# Patient Record
Sex: MALE | Race: WHITE | Employment: PART TIME | URBAN - METROPOLITAN AREA
[De-identification: names, ages, dates, MRNs, and addresses within clinical notes are randomized per-mention and may not be internally consistent; named-entity substitution may affect disease eponyms.]

---

## 2018-12-14 ENCOUNTER — APPOINTMENT (OUTPATIENT)
Dept: GENERAL RADIOLOGY | Age: 28
End: 2018-12-14

## 2018-12-14 ENCOUNTER — HOSPITAL ENCOUNTER (EMERGENCY)
Age: 28
Discharge: HOME | End: 2018-12-14
Attending: EMERGENCY MEDICINE

## 2018-12-14 VITALS
TEMPERATURE: 98.5 F | HEIGHT: 72 IN | BODY MASS INDEX: 32.51 KG/M2 | OXYGEN SATURATION: 98 % | SYSTOLIC BLOOD PRESSURE: 135 MMHG | HEART RATE: 82 BPM | WEIGHT: 240 LBS | RESPIRATION RATE: 16 BRPM | DIASTOLIC BLOOD PRESSURE: 85 MMHG

## 2018-12-14 DIAGNOSIS — L03.031 CELLULITIS OF TOE OF RIGHT FOOT: Primary | ICD-10-CM

## 2018-12-14 PROCEDURE — 99283 EMERGENCY DEPT VISIT LOW MDM: CPT

## 2018-12-14 PROCEDURE — 73630 X-RAY EXAM OF FOOT: CPT

## 2018-12-14 PROCEDURE — 6370000000 HC RX 637 (ALT 250 FOR IP): Performed by: EMERGENCY MEDICINE

## 2018-12-14 RX ORDER — CEPHALEXIN 500 MG/1
500 CAPSULE ORAL ONCE
Status: COMPLETED | OUTPATIENT
Start: 2018-12-14 | End: 2018-12-14

## 2018-12-14 RX ORDER — CEPHALEXIN 500 MG/1
500 CAPSULE ORAL 4 TIMES DAILY
Qty: 40 CAPSULE | Refills: 0 | Status: SHIPPED | OUTPATIENT
Start: 2018-12-14 | End: 2018-12-24

## 2018-12-14 RX ADMIN — CEPHALEXIN 500 MG: 500 CAPSULE ORAL at 22:01

## 2018-12-14 ASSESSMENT — PAIN DESCRIPTION - FREQUENCY: FREQUENCY: CONTINUOUS

## 2018-12-14 ASSESSMENT — PAIN SCALES - GENERAL: PAINLEVEL_OUTOF10: 4

## 2018-12-14 ASSESSMENT — PAIN DESCRIPTION - ONSET: ONSET: GRADUAL

## 2018-12-14 ASSESSMENT — PAIN DESCRIPTION - PAIN TYPE: TYPE: ACUTE PAIN

## 2018-12-14 ASSESSMENT — PAIN DESCRIPTION - PROGRESSION: CLINICAL_PROGRESSION: GRADUALLY WORSENING

## 2018-12-14 ASSESSMENT — PAIN DESCRIPTION - LOCATION: LOCATION: FOOT

## 2018-12-14 ASSESSMENT — PAIN DESCRIPTION - DESCRIPTORS: DESCRIPTORS: THROBBING;SHARP

## 2018-12-15 NOTE — ED PROVIDER NOTES
Dispense Refill    albuterol (PROVENTIL HFA) 108 (90 BASE) MCG/ACT inhaler Inhale 2 puffs into the lungs every 4 hours as needed for Wheezing. 1 Inhaler 3    PROVENTIL  (90 BASE) MCG/ACT inhaler INHALE 2 PUFFS INTO LUNGS EVERY 6 HOURS AS NEEDED FOR WHEEZING 1 Inhaler 3    naproxen (NAPROSYN) 500 MG tablet Take 1 tablet by mouth 2 times daily for 7 days 14 tablet 0    ALBUTEROL SULFATE HFA IN Inhale 2 puffs into the lungs daily as needed. No Known Allergies    REVIEW OF SYSTEMS  10 systems reviewed, pertinent positives per HPI otherwise noted to be negative     PHYSICAL EXAM  /85   Pulse 82   Temp 98.5 °F (36.9 °C) (Oral)   Resp 16   Ht 6' (1.829 m)   Wt 240 lb (108.9 kg)   SpO2 98%   BMI 32.55 kg/m²   GENERAL APPEARANCE: Awake and alert. Cooperative. In no obvious distress. HEAD: Normocephalic. Atraumatic. EYES: PERRL. EOM's grossly intact. ENT: Mucous membranes are pink and moist.   NECK: Supple. HEART: RRR. No murmurs. LUNGS: Respirations unlabored. CTAB. Good air exchange. ABDOMEN: Soft. Non-distended. Non-tender. No masses. No organomegaly. No guarding or rebound. EXTREMITIES: No peripheral edema. Moves all extremities equally. All extremities neurovascularly intact. There is redness to the medial aspect of the right foot and ankle which creeps up just above the malleolus. It is tender to touch is mildly swollen as well. There is no obvious indications of a penetrating foreign body from examination of the foot. SKIN: Warm and dry. No acute rashes. NEUROLOGICAL: Alert and oriented. Strength 5/5, sensation intact. Gait normal.   PSYCHIATRIC: Normal mood and affect. No HI or SI expressed to me. ED COURSE/MDM  Is no radiopaque foreign body so I think this patient probably got as a initiation of a cellulitis through the cracks in the sole of his foot. He'll be treated with p.o. antibiotics.   The redness will be outlined and he'll be advised on how to watch that

## 2018-12-15 NOTE — ED NOTES
PT DISCHARGED WITH VERBAL AND WRITTEN INSTRUCTIONS. PT REQUESTING WORK NOTE AND PAIN MEDICATION. ADVISED PT TO TAKE IBUPROFEN AND TYLENOL AS DIRECTED ON BOTTLE. WORK NOTE WRITTEN OFF UNTIL Tuesday.      Neyda Barnett RN  12/14/18 4342

## 2019-11-11 ENCOUNTER — APPOINTMENT (OUTPATIENT)
Dept: GENERAL RADIOLOGY | Age: 29
End: 2019-11-11
Payer: MEDICAID

## 2019-11-11 ENCOUNTER — HOSPITAL ENCOUNTER (EMERGENCY)
Age: 29
Discharge: HOME | End: 2019-11-11
Attending: EMERGENCY MEDICINE
Payer: MEDICAID

## 2019-11-11 VITALS
HEIGHT: 72 IN | SYSTOLIC BLOOD PRESSURE: 145 MMHG | WEIGHT: 240 LBS | RESPIRATION RATE: 15 BRPM | TEMPERATURE: 98.2 F | DIASTOLIC BLOOD PRESSURE: 78 MMHG | HEART RATE: 72 BPM | BODY MASS INDEX: 32.51 KG/M2 | OXYGEN SATURATION: 99 %

## 2019-11-11 DIAGNOSIS — I10 ESSENTIAL HYPERTENSION: ICD-10-CM

## 2019-11-11 DIAGNOSIS — S92.511A CLOSED DISPLACED FRACTURE OF PROXIMAL PHALANX OF LESSER TOE OF RIGHT FOOT, INITIAL ENCOUNTER: Primary | ICD-10-CM

## 2019-11-11 PROCEDURE — 28515 TREATMENT OF TOE FRACTURE: CPT

## 2019-11-11 PROCEDURE — 99283 EMERGENCY DEPT VISIT LOW MDM: CPT

## 2019-11-11 PROCEDURE — 73660 X-RAY EXAM OF TOE(S): CPT

## 2019-11-11 ASSESSMENT — PAIN DESCRIPTION - LOCATION: LOCATION: TOE (COMMENT WHICH ONE)

## 2019-11-11 ASSESSMENT — PAIN SCALES - GENERAL: PAINLEVEL_OUTOF10: 1

## 2019-11-11 ASSESSMENT — PAIN DESCRIPTION - PAIN TYPE: TYPE: ACUTE PAIN

## 2019-11-11 ASSESSMENT — PAIN DESCRIPTION - ORIENTATION: ORIENTATION: RIGHT

## 2019-11-11 ASSESSMENT — PAIN DESCRIPTION - DESCRIPTORS: DESCRIPTORS: NUMBNESS

## 2020-06-11 ENCOUNTER — HOSPITAL ENCOUNTER (OUTPATIENT)
Age: 30
Discharge: HOME | End: 2020-06-11
Payer: COMMERCIAL

## 2020-06-11 LAB
INR BLD: 0.99 (ref 0.86–1.14)
PROTHROMBIN TIME: 11.5 SEC (ref 10–13.2)

## 2020-06-11 PROCEDURE — 80053 COMPREHEN METABOLIC PANEL: CPT

## 2020-06-11 PROCEDURE — 85610 PROTHROMBIN TIME: CPT

## 2020-06-11 PROCEDURE — 80307 DRUG TEST PRSMV CHEM ANLYZR: CPT

## 2020-06-11 PROCEDURE — 85025 COMPLETE CBC W/AUTO DIFF WBC: CPT

## 2020-06-11 PROCEDURE — 87522 HEPATITIS C REVRS TRNSCRPJ: CPT

## 2020-06-11 PROCEDURE — 36415 COLL VENOUS BLD VENIPUNCTURE: CPT

## 2020-06-11 PROCEDURE — 82105 ALPHA-FETOPROTEIN SERUM: CPT

## 2020-06-11 PROCEDURE — 87902 NFCT AGT GNTYP ALYS HEP C: CPT

## 2020-06-12 LAB
A/G RATIO: 1.8 (ref 1.1–2.2)
ALBUMIN SERPL-MCNC: 4.3 G/DL (ref 3.4–5)
ALP BLD-CCNC: 61 U/L (ref 40–129)
ALT SERPL-CCNC: 118 U/L (ref 10–40)
AMPHETAMINE SCREEN, URINE: NORMAL
ANION GAP SERPL CALCULATED.3IONS-SCNC: 10 MMOL/L (ref 3–16)
AST SERPL-CCNC: 76 U/L (ref 15–37)
BARBITURATE SCREEN URINE: NORMAL
BASOPHILS ABSOLUTE: 0 K/UL (ref 0–0.2)
BASOPHILS RELATIVE PERCENT: 0.4 %
BENZODIAZEPINE SCREEN, URINE: NORMAL
BILIRUB SERPL-MCNC: 0.8 MG/DL (ref 0–1)
BUN BLDV-MCNC: 9 MG/DL (ref 7–20)
CALCIUM SERPL-MCNC: 8.3 MG/DL (ref 8.3–10.6)
CANNABINOID SCREEN URINE: NORMAL
CHLORIDE BLD-SCNC: 106 MMOL/L (ref 99–110)
CO2: 25 MMOL/L (ref 21–32)
COCAINE METABOLITE SCREEN URINE: NORMAL
CREAT SERPL-MCNC: 0.8 MG/DL (ref 0.9–1.3)
EOSINOPHILS ABSOLUTE: 0.2 K/UL (ref 0–0.6)
EOSINOPHILS RELATIVE PERCENT: 2.6 %
GFR AFRICAN AMERICAN: >60
GFR NON-AFRICAN AMERICAN: >60
GLOBULIN: 2.4 G/DL
GLUCOSE BLD-MCNC: 97 MG/DL (ref 70–99)
HCT VFR BLD CALC: 40 % (ref 40.5–52.5)
HEMOGLOBIN: 14 G/DL (ref 13.5–17.5)
LYMPHOCYTES ABSOLUTE: 1.7 K/UL (ref 1–5.1)
LYMPHOCYTES RELATIVE PERCENT: 25.3 %
Lab: NORMAL
MCH RBC QN AUTO: 31.5 PG (ref 26–34)
MCHC RBC AUTO-ENTMCNC: 35.1 G/DL (ref 31–36)
MCV RBC AUTO: 89.8 FL (ref 80–100)
METHADONE SCREEN, URINE: NORMAL
MONOCYTES ABSOLUTE: 0.7 K/UL (ref 0–1.3)
MONOCYTES RELATIVE PERCENT: 10.1 %
NEUTROPHILS ABSOLUTE: 4.1 K/UL (ref 1.7–7.7)
NEUTROPHILS RELATIVE PERCENT: 61.6 %
OPIATE SCREEN URINE: NORMAL
OXYCODONE URINE: NORMAL
PDW BLD-RTO: 12.3 % (ref 12.4–15.4)
PH UA: 5
PHENCYCLIDINE SCREEN URINE: NORMAL
PLATELET # BLD: 233 K/UL (ref 135–450)
PMV BLD AUTO: 8.2 FL (ref 5–10.5)
POTASSIUM SERPL-SCNC: 3.8 MMOL/L (ref 3.5–5.1)
PROPOXYPHENE SCREEN: NORMAL
RBC # BLD: 4.45 M/UL (ref 4.2–5.9)
SODIUM BLD-SCNC: 141 MMOL/L (ref 136–145)
TOTAL PROTEIN: 6.7 G/DL (ref 6.4–8.2)
WBC # BLD: 6.7 K/UL (ref 4–11)

## 2020-06-15 LAB
HCV QNT BY NAAT IU/ML: ABNORMAL IU/ML
HCV QNT BY NAAT LOG IU/ML: 5.93 LOG IU/ML
INTERPRETATION: DETECTED

## 2020-06-16 LAB — AFP: 2.3 UG/L

## 2020-06-18 LAB — HEPATITIS C GENOTYPE: NORMAL

## 2021-01-03 ENCOUNTER — HOSPITAL ENCOUNTER (EMERGENCY)
Age: 31
Discharge: HOME | End: 2021-01-03
Attending: STUDENT IN AN ORGANIZED HEALTH CARE EDUCATION/TRAINING PROGRAM
Payer: COMMERCIAL

## 2021-01-03 ENCOUNTER — APPOINTMENT (OUTPATIENT)
Dept: CT IMAGING | Age: 31
End: 2021-01-03
Payer: COMMERCIAL

## 2021-01-03 VITALS
BODY MASS INDEX: 33.18 KG/M2 | SYSTOLIC BLOOD PRESSURE: 142 MMHG | HEIGHT: 72 IN | TEMPERATURE: 98.2 F | DIASTOLIC BLOOD PRESSURE: 81 MMHG | RESPIRATION RATE: 18 BRPM | WEIGHT: 245 LBS | OXYGEN SATURATION: 96 % | HEART RATE: 80 BPM

## 2021-01-03 DIAGNOSIS — J18.9 PNEUMONIA DUE TO ORGANISM: ICD-10-CM

## 2021-01-03 DIAGNOSIS — R10.9 BILATERAL FLANK PAIN: Primary | ICD-10-CM

## 2021-01-03 LAB
A/G RATIO: 1.5 (ref 1.1–2.2)
ALBUMIN SERPL-MCNC: 4.1 G/DL (ref 3.4–5)
ALP BLD-CCNC: 64 U/L (ref 40–129)
ALT SERPL-CCNC: 73 U/L (ref 10–40)
AMORPHOUS: ABNORMAL /HPF
ANION GAP SERPL CALCULATED.3IONS-SCNC: 5 MMOL/L (ref 3–16)
AST SERPL-CCNC: 35 U/L (ref 15–37)
BACTERIA: ABNORMAL /HPF
BASOPHILS ABSOLUTE: 0 K/UL (ref 0–0.2)
BASOPHILS RELATIVE PERCENT: 0.5 %
BILIRUB SERPL-MCNC: 0.4 MG/DL (ref 0–1)
BILIRUBIN URINE: NEGATIVE
BLOOD, URINE: ABNORMAL
BUN BLDV-MCNC: 11 MG/DL (ref 7–20)
CALCIUM SERPL-MCNC: 9.3 MG/DL (ref 8.3–10.6)
CHLORIDE BLD-SCNC: 105 MMOL/L (ref 99–110)
CLARITY: ABNORMAL
CO2: 31 MMOL/L (ref 21–32)
COLOR: YELLOW
CREAT SERPL-MCNC: 0.9 MG/DL (ref 0.9–1.3)
EOSINOPHILS ABSOLUTE: 0.2 K/UL (ref 0–0.6)
EOSINOPHILS RELATIVE PERCENT: 3.1 %
EPITHELIAL CELLS, UA: ABNORMAL /HPF (ref 0–5)
GFR AFRICAN AMERICAN: >60
GFR NON-AFRICAN AMERICAN: >60
GLOBULIN: 2.8 G/DL
GLUCOSE BLD-MCNC: 100 MG/DL (ref 70–99)
GLUCOSE URINE: NEGATIVE MG/DL
HCT VFR BLD CALC: 42.4 % (ref 40.5–52.5)
HEMOGLOBIN: 14.4 G/DL (ref 13.5–17.5)
KETONES, URINE: ABNORMAL MG/DL
LEUKOCYTE ESTERASE, URINE: NEGATIVE
LIPASE: 28 U/L (ref 13–60)
LYMPHOCYTES ABSOLUTE: 1.9 K/UL (ref 1–5.1)
LYMPHOCYTES RELATIVE PERCENT: 24.3 %
MCH RBC QN AUTO: 30.7 PG (ref 26–34)
MCHC RBC AUTO-ENTMCNC: 34 G/DL (ref 31–36)
MCV RBC AUTO: 90.1 FL (ref 80–100)
MICROSCOPIC EXAMINATION: YES
MONOCYTES ABSOLUTE: 0.8 K/UL (ref 0–1.3)
MONOCYTES RELATIVE PERCENT: 10.7 %
NEUTROPHILS ABSOLUTE: 4.7 K/UL (ref 1.7–7.7)
NEUTROPHILS RELATIVE PERCENT: 61.4 %
NITRITE, URINE: NEGATIVE
PDW BLD-RTO: 12.6 % (ref 12.4–15.4)
PH UA: 7.5 (ref 5–8)
PLATELET # BLD: 210 K/UL (ref 135–450)
PMV BLD AUTO: 7.2 FL (ref 5–10.5)
POTASSIUM REFLEX MAGNESIUM: 3.9 MMOL/L (ref 3.5–5.1)
PROTEIN UA: NEGATIVE MG/DL
RBC # BLD: 4.71 M/UL (ref 4.2–5.9)
RBC UA: ABNORMAL /HPF (ref 0–4)
SODIUM BLD-SCNC: 141 MMOL/L (ref 136–145)
SPECIFIC GRAVITY UA: 1.02 (ref 1–1.03)
TOTAL PROTEIN: 6.9 G/DL (ref 6.4–8.2)
URINE TYPE: ABNORMAL
UROBILINOGEN, URINE: 0.2 E.U./DL
WBC # BLD: 7.7 K/UL (ref 4–11)
WBC UA: ABNORMAL /HPF (ref 0–5)

## 2021-01-03 PROCEDURE — 6360000002 HC RX W HCPCS: Performed by: STUDENT IN AN ORGANIZED HEALTH CARE EDUCATION/TRAINING PROGRAM

## 2021-01-03 PROCEDURE — 74176 CT ABD & PELVIS W/O CONTRAST: CPT

## 2021-01-03 PROCEDURE — 80053 COMPREHEN METABOLIC PANEL: CPT

## 2021-01-03 PROCEDURE — 96374 THER/PROPH/DIAG INJ IV PUSH: CPT

## 2021-01-03 PROCEDURE — 85025 COMPLETE CBC W/AUTO DIFF WBC: CPT

## 2021-01-03 PROCEDURE — 83690 ASSAY OF LIPASE: CPT

## 2021-01-03 PROCEDURE — 96375 TX/PRO/DX INJ NEW DRUG ADDON: CPT

## 2021-01-03 PROCEDURE — 81001 URINALYSIS AUTO W/SCOPE: CPT

## 2021-01-03 PROCEDURE — 99283 EMERGENCY DEPT VISIT LOW MDM: CPT

## 2021-01-03 PROCEDURE — 36415 COLL VENOUS BLD VENIPUNCTURE: CPT

## 2021-01-03 RX ORDER — FLUTICASONE PROPIONATE 50 MCG
1 SPRAY, SUSPENSION (ML) NASAL DAILY
COMMUNITY

## 2021-01-03 RX ORDER — AZITHROMYCIN 250 MG/1
250 TABLET, FILM COATED ORAL SEE ADMIN INSTRUCTIONS
Qty: 6 TABLET | Refills: 0 | Status: SHIPPED | OUTPATIENT
Start: 2021-01-03 | End: 2021-01-08

## 2021-01-03 RX ORDER — ONDANSETRON 2 MG/ML
4 INJECTION INTRAMUSCULAR; INTRAVENOUS ONCE
Status: COMPLETED | OUTPATIENT
Start: 2021-01-03 | End: 2021-01-03

## 2021-01-03 RX ORDER — KETOROLAC TROMETHAMINE 30 MG/ML
15 INJECTION, SOLUTION INTRAMUSCULAR; INTRAVENOUS ONCE
Status: COMPLETED | OUTPATIENT
Start: 2021-01-03 | End: 2021-01-03

## 2021-01-03 RX ADMIN — ONDANSETRON HYDROCHLORIDE 4 MG: 2 INJECTION, SOLUTION INTRAMUSCULAR; INTRAVENOUS at 20:05

## 2021-01-03 RX ADMIN — KETOROLAC TROMETHAMINE 15 MG: 30 INJECTION, SOLUTION INTRAMUSCULAR at 20:05

## 2021-01-03 ASSESSMENT — PAIN SCALES - GENERAL: PAINLEVEL_OUTOF10: 2

## 2021-01-04 NOTE — ED NOTES
Pain since Thursday. Pain increased in mid back and worse on right. Denies radiation into legs. Denies any numbness and tingling in lower ext. Reports noticed pain when leaning over bathtub given infant a bath.  Reports pain in worse with certain movements     Danna See, RN  01/03/21 5947 [facility], RN  01/03/21 3801

## 2021-01-04 NOTE — ED PROVIDER NOTES
[facility] EMERGENCY DEPARTMENT      CHIEF COMPLAINT  Flank Pain (per pt.s/s started 3 days ago thought it was more of a muscular issue, but now is more painful and urine is dark, pt. denies dificulty with urinating  )     HISTORY OF PRESENT ILLNESS  Re Evans is a 31 y.o. male  who presents to the ED complaining of bilateral flank pain, worse on the right. Patient states that he has had symptoms for the past 3 to 4 days. She noticed this last Wednesday, and it worsened on Thursday as he was bending over to give his baby a bath. He denies any other known injuries. He states that it was worse yesterday and he slightly improved today. However, he states that his urine has been dark and his fiancée is concerned that it may be a kidney issue given its location and his dark urine. He denies any dysuria or hematuria. He denies any history of kidney stones. He denies any numbness or tingling, loss of bowel or bladder continence, recent unintentional weight loss, history of IV drug use, history of cancer. He states he has been taking naproxen at home which has helped slightly with his symptoms. He states that at times, is been so severe that he is unable to sit still and is rolling around in pain. He also states that he has had some unexplained nausea over this time as well. Denies any fevers. Denies any chest pain or shortness of breath. He denies other complaints at this time. No other complaints, modifying factors or associated symptoms. I have reviewed the following from the nursing documentation. Past Medical History:   Diagnosis Date    Ankle fracture     Asthma      Past Surgical History:   Procedure Laterality Date    TYMPANOSTOMY TUBE PLACEMENT       History reviewed. No pertinent family history.   Social History     Socioeconomic History    Marital status: Single     Spouse name: Not on file    Number of children: Not on file    Years of education: Not on file  Highest education level: Not on file   Occupational History    Not on file   Social Needs    Financial resource strain: Not on file    Food insecurity     Worry: Not on file     Inability: Not on file    Transportation needs     Medical: Not on file     Non-medical: Not on file   Tobacco Use    Smoking status: Former Smoker     Packs/day: 1.00    Smokeless tobacco: Current User   Substance and Sexual Activity    Alcohol use: No    Drug use: No     Comment: Heroin    Sexual activity: Yes     Partners: Female   Lifestyle    Physical activity     Days per week: Not on file     Minutes per session: Not on file    Stress: Not on file   Relationships    Social connections     Talks on phone: Not on file     Gets together: Not on file     Attends Pentecostal service: Not on file     Active member of club or organization: Not on file     Attends meetings of clubs or organizations: Not on file     Relationship status: Not on file    Intimate partner violence     Fear of current or ex partner: Not on file     Emotionally abused: Not on file     Physically abused: Not on file     Forced sexual activity: Not on file   Other Topics Concern    Not on file   Social History Narrative    Not on file     No current facility-administered medications for this encounter. Current Outpatient Medications   Medication Sig Dispense Refill    fluticasone (FLONASE) 50 MCG/ACT nasal spray 1 spray by Each Nostril route daily      azithromycin (ZITHROMAX) 250 MG tablet Take 1 tablet by mouth See Admin Instructions for 5 days 500mg on day 1 followed by 250mg on days 2 - 5 6 tablet 0    Budesonide-Formoterol Fumarate (SYMBICORT IN) Inhale into the lungs       No Known Allergies    REVIEW OF SYSTEMS  10 systems reviewed, pertinent positives per HPI otherwise noted to be negative.     PHYSICAL EXAM BP (!) 142/81   Pulse 80   Temp 98.2 °F (36.8 °C) (Oral)   Resp 18   Ht 6' (1.829 m)   Wt 245 lb (111.1 kg)   SpO2 96%   BMI 33.23 kg/m²    GENERAL APPEARANCE: Awake and alert. Cooperative. No acute distress. HENT: Normocephalic. Atraumatic. NECK: Supple. EYES: PERRL. EOM's grossly intact. HEART/CHEST: RRR. No murmurs. LUNGS: Respirations unlabored. CTAB. Good air exchange. Speaking comfortably in full sentences. ABDOMEN: No tenderness. Soft. Non-distended. No masses. No organomegaly. No guarding or rebound. MUSCULOSKELETAL: No extremity edema. Compartments soft. No deformity. No tenderness in the extremities. All extremities neurovascularly intact. BACK: No true CVA tenderness. There is bilateral paraspinal tenderness to palpation in the lumbar spine. No midline tenderness no C, T, or L-spine. SKIN: Warm and dry. No acute rashes. NEUROLOGICAL: Alert and oriented. CN's 2-12 intact. No gross facial drooping. Strength 5/5, sensation intact. Positive straight leg raise on L, negative on R.  PSYCHIATRIC: Anxious    LABS  I have reviewed all labs for this visit.    Results for orders placed or performed during the hospital encounter of 01/03/21   Urinalysis   Result Value Ref Range    Color, UA Yellow Straw/Yellow    Clarity, UA SL CLOUDY (A) Clear    Glucose, Ur Negative Negative mg/dL    Bilirubin Urine Negative Negative    Ketones, Urine TRACE (A) Negative mg/dL    Specific Gravity, UA 1.020 1.005 - 1.030    Blood, Urine SMALL (A) Negative    pH, UA 7.5 5.0 - 8.0    Protein, UA Negative Negative mg/dL    Urobilinogen, Urine 0.2 <2.0 E.U./dL    Nitrite, Urine Negative Negative    Leukocyte Esterase, Urine Negative Negative    Microscopic Examination YES     Urine Type NotGiven    Microscopic Urinalysis   Result Value Ref Range    WBC, UA 0-2 0 - 5 /HPF    RBC, UA 3-4 0 - 4 /HPF    Epithelial Cells, UA 0-1 0 - 5 /HPF    Bacteria, UA Rare (A) None Seen /HPF    Amorphous, UA 1+ /HPF CBC Auto Differential   Result Value Ref Range    WBC 7.7 4.0 - 11.0 K/uL    RBC 4.71 4.20 - 5.90 M/uL    Hemoglobin 14.4 13.5 - 17.5 g/dL    Hematocrit 42.4 40.5 - 52.5 %    MCV 90.1 80.0 - 100.0 fL    MCH 30.7 26.0 - 34.0 pg    MCHC 34.0 31.0 - 36.0 g/dL    RDW 12.6 12.4 - 15.4 %    Platelets 889 824 - 304 K/uL    MPV 7.2 5.0 - 10.5 fL    Neutrophils % 61.4 %    Lymphocytes % 24.3 %    Monocytes % 10.7 %    Eosinophils % 3.1 %    Basophils % 0.5 %    Neutrophils Absolute 4.7 1.7 - 7.7 K/uL    Lymphocytes Absolute 1.9 1.0 - 5.1 K/uL    Monocytes Absolute 0.8 0.0 - 1.3 K/uL    Eosinophils Absolute 0.2 0.0 - 0.6 K/uL    Basophils Absolute 0.0 0.0 - 0.2 K/uL   Comprehensive Metabolic Panel w/ Reflex to MG   Result Value Ref Range    Sodium 141 136 - 145 mmol/L    Potassium reflex Magnesium 3.9 3.5 - 5.1 mmol/L    Chloride 105 99 - 110 mmol/L    CO2 31 21 - 32 mmol/L    Anion Gap 5 3 - 16    Glucose 100 (H) 70 - 99 mg/dL    BUN 11 7 - 20 mg/dL    CREATININE 0.9 0.9 - 1.3 mg/dL    GFR Non-African American >60 >60    GFR African American >60 >60    Calcium 9.3 8.3 - 10.6 mg/dL    Total Protein 6.9 6.4 - 8.2 g/dL    Alb 4.1 3.4 - 5.0 g/dL    Albumin/Globulin Ratio 1.5 1.1 - 2.2    Total Bilirubin 0.4 0.0 - 1.0 mg/dL    Alkaline Phosphatase 64 40 - 129 U/L    ALT 73 (H) 10 - 40 U/L    AST 35 15 - 37 U/L    Globulin 2.8 g/dL   Lipase   Result Value Ref Range    Lipase 28.0 13.0 - 60.0 U/L     RADIOLOGY  CT ABDOMEN PELVIS WO CONTRAST Additional Contrast? None   Final Result   No acute intra-abdominal or pelvic abnormality. Small focal left lower lobe infiltrate suspicious for pneumonia. ED COURSE/MDM  Patient seen and evaluated. Old records reviewed. Labs and imaging reviewed and results discussed with patient. Patient presents with concerns for bilateral low back/flank pain for the past several days, as well as dark urine. Full HPI as detailed above. Upon arrival in the ED, vitals reassuring. Patient resting comfortably. Physical exam as detailed above, no abdominal tenderness. No midline tenderness palpation the C, T, or L-spine. No true CVA tenderness. He does have some paraspinal muscle tenderness in the lumbar spine. UA did show small blood, rare bacteria, negative leukocyte esterase. Negative for evidence of infection. Given the hematuria, CT abdomen pelvis was performed that did not reveal any acute abdominal findings or evidence of nephrolithiasis, however did reveal an incidental finding of a small pneumonia. Findings are discussed with the patient. I believe that his back pain is likely musculoskeletal in nature and encouraged him to continue taking NSAIDs as needed for the pain. He was also informed of the incidental finding of pneumonia and will be sent with a prescription for antibiotics. Patient was extremely anxious regarding these findings, his vital signs are stable here, he is not having any symptoms at all and I feel that he is appropriate for outpatient treatment. He is comfortable in agreement with plan of care. He will be discharged. He is given return precautions advised to follow-up with his PCP. During the patient's ED course, the patient was given:  Medications   ketorolac (TORADOL) injection 15 mg (15 mg Intravenous Given 1/3/21 2005)   ondansetron (ZOFRAN) injection 4 mg (4 mg Intravenous Given 1/3/21 2005)        CLINICAL IMPRESSION  1. Bilateral flank pain    2. Pneumonia due to organism        Blood pressure (!) 142/81, pulse 80, temperature 98.2 °F (36.8 °C), temperature source Oral, resp. rate 18, height 6' (1.829 m), weight 245 lb (111.1 kg), SpO2 96 %. DISPOSITION  Coy Marlow was discharged to home in good condition. Patient was given scripts for the following medications. I counseled patient how to take these medications. Discharge Medication List as of 1/3/2021  8:44 PM      START taking these medications    Details   azithromycin (ZITHROMAX) 250 MG tablet Take 1 tablet by mouth See Admin Instructions for 5 days 500mg on day 1 followed by 250mg on days 2 - 5, Disp-6 tablet, R-0Print             Follow-up with:  MD Isabelle Barrientos [facility] [address]47  [phone]    Schedule an appointment as soon as possible for a visit   As needed    [facility] Emergency Department  [address] [place] [address],[address]  [phone]  Go to   If symptoms worsen      DISCLAIMER: This chart was created using Dragon dictation software. Efforts were made by me to ensure accuracy, however some errors may be present due to limitations of this technology and occasionally words are not transcribed correctly.         Gene Oakley MD  01/04/21 7971

## 2021-09-02 ENCOUNTER — HOSPITAL ENCOUNTER (EMERGENCY)
Age: 31
Discharge: HOME | End: 2021-09-02
Attending: STUDENT IN AN ORGANIZED HEALTH CARE EDUCATION/TRAINING PROGRAM
Payer: COMMERCIAL

## 2021-09-02 VITALS
TEMPERATURE: 98 F | BODY MASS INDEX: 34.54 KG/M2 | WEIGHT: 255 LBS | HEIGHT: 72 IN | HEART RATE: 99 BPM | OXYGEN SATURATION: 100 % | SYSTOLIC BLOOD PRESSURE: 117 MMHG | DIASTOLIC BLOOD PRESSURE: 64 MMHG | RESPIRATION RATE: 16 BRPM

## 2021-09-02 DIAGNOSIS — S05.00XA CORNEAL ABRASION, UNSPECIFIED LATERALITY, INITIAL ENCOUNTER: Primary | ICD-10-CM

## 2021-09-02 PROCEDURE — 99284 EMERGENCY DEPT VISIT MOD MDM: CPT

## 2021-09-02 PROCEDURE — 6370000000 HC RX 637 (ALT 250 FOR IP): Performed by: STUDENT IN AN ORGANIZED HEALTH CARE EDUCATION/TRAINING PROGRAM

## 2021-09-02 RX ORDER — BUPRENORPHINE AND NALOXONE 8; 2 MG/1; MG/1
2 FILM, SOLUBLE BUCCAL; SUBLINGUAL
COMMUNITY
Start: 2020-02-26

## 2021-09-02 RX ORDER — IBUPROFEN 400 MG/1
800 TABLET ORAL ONCE
Status: COMPLETED | OUTPATIENT
Start: 2021-09-02 | End: 2021-09-02

## 2021-09-02 RX ORDER — ERYTHROMYCIN 5 MG/G
OINTMENT OPHTHALMIC ONCE
Status: COMPLETED | OUTPATIENT
Start: 2021-09-02 | End: 2021-09-02

## 2021-09-02 RX ORDER — ERYTHROMYCIN 5 MG/G
OINTMENT OPHTHALMIC ONCE
Qty: 1 EACH | Refills: 0 | Status: SHIPPED | OUTPATIENT
Start: 2021-09-02 | End: 2021-09-02

## 2021-09-02 RX ADMIN — ERYTHROMYCIN: 5 OINTMENT OPHTHALMIC at 17:41

## 2021-09-02 RX ADMIN — IBUPROFEN 800 MG: 400 TABLET, FILM COATED ORAL at 17:41

## 2021-09-02 ASSESSMENT — VISUAL ACUITY
OD: 20/25
OU: 20/200
OS: 20/200

## 2021-09-02 ASSESSMENT — ENCOUNTER SYMPTOMS
PHOTOPHOBIA: 1
EYE REDNESS: 1
EYE PAIN: 1
EYE DISCHARGE: 1
EYE ITCHING: 1

## 2021-09-02 ASSESSMENT — PAIN DESCRIPTION - LOCATION: LOCATION: EYE

## 2021-09-02 ASSESSMENT — PAIN DESCRIPTION - PROGRESSION: CLINICAL_PROGRESSION: GRADUALLY IMPROVING

## 2021-09-02 ASSESSMENT — PAIN SCALES - GENERAL
PAINLEVEL_OUTOF10: 6
PAINLEVEL_OUTOF10: 4

## 2021-09-02 ASSESSMENT — PAIN DESCRIPTION - PAIN TYPE: TYPE: ACUTE PAIN

## 2021-09-02 ASSESSMENT — PAIN DESCRIPTION - FREQUENCY: FREQUENCY: CONTINUOUS

## 2021-09-02 ASSESSMENT — PAIN DESCRIPTION - ORIENTATION: ORIENTATION: LEFT

## 2021-09-02 ASSESSMENT — PAIN - FUNCTIONAL ASSESSMENT: PAIN_FUNCTIONAL_ASSESSMENT: ACTIVITIES ARE NOT PREVENTED

## 2021-09-02 ASSESSMENT — PAIN DESCRIPTION - ONSET: ONSET: SUDDEN

## 2021-09-02 NOTE — ED PROVIDER NOTES
[facility] Name: Rox Blanco  MRN: [id number]  [place] 1990  Date of evaluation: 9/2/2021  Provider: Jose Stark DO    CHIEF COMPLAINT       Chief Complaint   Patient presents with    Foreign Body in Eye     Patient advises his daughter scratched his left eye last night and has had pain with difficulty seeing in the eye since. HISTORY OF PRESENT ILLNESS   (Location/Symptom, Timing/Onset, Context/Setting, Quality, Duration, Modifying Factors, Severity)  Note limiting factors. Rox Blanco is a 36 y.o. male who presents to the emergency department complaining of eye pain, irritation, foreign body sensation. Reports the daughter accidentally scratched him in the left eye yesterday. Pain is worsening throughout the day. Does note some drainage this morning in the corner of his eye. Vision is affected to the left eye due to tearing. No significant pain with range of motion of the eye itself. Nursing Notes were reviewed. PAST MEDICAL HISTORY     Past Medical History:   Diagnosis Date    Ankle fracture     Asthma          SURGICAL HISTORY       Past Surgical History:   Procedure Laterality Date    TYMPANOSTOMY TUBE PLACEMENT           CURRENT MEDICATIONS       Previous Medications    BUDESONIDE-FORMOTEROL FUMARATE (SYMBICORT IN)    Inhale into the lungs    BUPRENORPHINE-NALOXONE (SUBOXONE) 8-2 MG FILM SL FILM    Place 2 Film under the tongue. FLUTICASONE (FLONASE) 50 MCG/ACT NASAL SPRAY    1 spray by Each Nostril route daily       ALLERGIES     Patient has no known allergies. FAMILY HISTORY     History reviewed. No pertinent family history.        SOCIAL HISTORY       Social History     Socioeconomic History    Marital status: Single     Spouse name: None    Number of children: None    Years of education: None    Highest education level: None   Occupational History    None   Tobacco Use    Smoking status: Former Smoker     Packs/day: 1.00    Smokeless tobacco: Current User   Vaping Use    Vaping Use: Every day    Substances: Always, THC    Devices: Pre-filled or refillable cartridge   Substance and Sexual Activity    Alcohol use: No    Drug use: No     Comment: Heroin    Sexual activity: Yes     Partners: Female   Other Topics Concern    None   Social History Narrative    None     Social Determinants of Health     Financial Resource Strain:     Difficulty of Paying Living Expenses:    Food Insecurity:     Worried About Running Out of Food in the Last Year:     Ran Out of Food in the Last Year:    Transportation Needs:     Lack of Transportation (Medical):  Lack of Transportation (Non-Medical):    Physical Activity:     Days of Exercise per Week:     Minutes of Exercise per Session:    Stress:     Feeling of Stress :    Social Connections:     Frequency of Communication with Friends and Family:     Frequency of Social Gatherings with Friends and Family:     Attends Yazdanism Services:     Active Member of Clubs or Organizations:     Attends Club or Organization Meetings:     Marital Status:    Intimate Partner Violence:     Fear of Current or Ex-Partner:     Emotionally Abused:     Physically Abused:     Sexually Abused:        SCREENINGS                            REVIEW OF SYSTEMS    (2-9 systems for level 4, 10 or more for level 5)   Review of Systems   HENT: Negative. Eyes: Positive for photophobia, pain, discharge, redness and itching. PHYSICAL EXAM    (up to 7 for level 4, 8 or more for level 5)   RECENT VITALS:     Temp: 98 °F (36.7 °C),  Pulse: 99, Resp: 16, BP: 117/64, SpO2: 100 %    Physical Exam  Constitutional:       Appearance: Normal appearance. Eyes:      General: Lids are normal. Lids are everted, no foreign bodies appreciated. Left eye: No foreign body. Conjunctiva/sclera:      Left eye: Left conjunctiva is injected.         Comments: Corneal abrasion. Negative Jeovanny   Neurological:      Mental Status: He is alert. DIAGNOSTIC RESULTS     EKG: All EKG's are interpreted by the Emergency Department Physician who either signs or Co-signs this chart in the absence of a cardiologist.      RADIOLOGY:   Non-plain film images such as CT, Ultrasound and MRI are read by the radiologist. Plain radiographic images are visualized and preliminarily interpreted by the emergency physician. Interpretation per the Radiologist below, if available at the time of this note:    No orders to display         LABS:  Labs Reviewed - No data to display    All other labs were within normal range or not returned as of this dictation. EMERGENCY DEPARTMENT COURSE and DIFFERENTIAL DIAGNOSIS/MDM:   Rachel Luevano is a 36 y.o. male who presents to the emergency department with the complaint of pain irritation, was scratched yesterday by daughter. Was lamp exam was performed with fluorescein staining with positive for corneal abrasion. Negative Jeovanny sign. Given return precautions for development of worsening eye pain drainage swelling around the eye. Patient has ophthalmology referral already encouraged follow-up. Will discharge on erythromycin, encourage NSAIDs. CRITICAL CARE TIME   Total Critical Care time was 0 minutes, excluding separately reportable procedures. There was a high probability of clinically significant/life threatening deterioration in the patient's condition which required my urgent intervention. Clinical concern   Intervention     CONSULTS:  None    PROCEDURES:  Unless otherwise noted below, none     Procedures        FINAL IMPRESSION      1.  Corneal abrasion, unspecified laterality, initial encounter          DISPOSITION/PLAN   DISPOSITION Decision To Discharge 09/02/2021 05:26:54 PM      PATIENT REFERRED TO:  [facility] Emergency Department  Isabelle De Luna [address] [place] [address], [phone] [place] [address]    If symptoms nnamdi Hickey MD  . [facility] [address] [phone]    Schedule an appointment as soon as possible for a visit         DISCHARGE MEDICATIONS:  New Prescriptions    ERYTHROMYCIN [facility]) 5 MG/GM OPHTHALMIC OINTMENT    Place into the left eye once for 1 dose     Controlled Substances Monitoring:     RX Monitoring 8/9/2017   Attestation The Prescription Monitoring Report for this patient was reviewed today. Periodic Controlled Substance Monitoring No signs of potential drug abuse or diversion identified.        (Please note that portions of this note were completed with a voice recognition program.  Efforts were made to edit the dictations but occasionally words are mis-transcribed.)    Tyrus Schwab, DO (electronically signed)  Attending Emergency Physician            Tyrus Schwab, DO  09/02/21 2263

## 2021-09-02 NOTE — ED NOTES
Pt discharge instructions, follow up and rx x 1 reviewed with pt. Pt verbalized understanding. No further needs. Pt discharged at this time.         Orville Cruz RN  09/02/21 5426

## 2022-01-02 ENCOUNTER — HOSPITAL ENCOUNTER (EMERGENCY)
Age: 32
Discharge: HOME | End: 2022-01-02
Attending: STUDENT IN AN ORGANIZED HEALTH CARE EDUCATION/TRAINING PROGRAM
Payer: COMMERCIAL

## 2022-01-02 ENCOUNTER — APPOINTMENT (OUTPATIENT)
Dept: GENERAL RADIOLOGY | Age: 32
End: 2022-01-02
Payer: COMMERCIAL

## 2022-01-02 VITALS
TEMPERATURE: 98.2 F | DIASTOLIC BLOOD PRESSURE: 108 MMHG | WEIGHT: 250 LBS | RESPIRATION RATE: 16 BRPM | OXYGEN SATURATION: 95 % | HEIGHT: 72 IN | HEART RATE: 87 BPM | SYSTOLIC BLOOD PRESSURE: 157 MMHG | BODY MASS INDEX: 33.86 KG/M2

## 2022-01-02 DIAGNOSIS — L03.113 CELLULITIS OF RIGHT UPPER EXTREMITY: ICD-10-CM

## 2022-01-02 DIAGNOSIS — M79.641 RIGHT HAND PAIN: Primary | ICD-10-CM

## 2022-01-02 PROCEDURE — 6370000000 HC RX 637 (ALT 250 FOR IP): Performed by: STUDENT IN AN ORGANIZED HEALTH CARE EDUCATION/TRAINING PROGRAM

## 2022-01-02 PROCEDURE — 99285 EMERGENCY DEPT VISIT HI MDM: CPT

## 2022-01-02 PROCEDURE — 73130 X-RAY EXAM OF HAND: CPT

## 2022-01-02 RX ORDER — CEPHALEXIN 500 MG/1
500 CAPSULE ORAL 4 TIMES DAILY
Qty: 28 CAPSULE | Refills: 0 | Status: SHIPPED | OUTPATIENT
Start: 2022-01-02 | End: 2022-01-09

## 2022-01-02 RX ORDER — SULFAMETHOXAZOLE AND TRIMETHOPRIM 800; 160 MG/1; MG/1
1 TABLET ORAL ONCE
Status: COMPLETED | OUTPATIENT
Start: 2022-01-02 | End: 2022-01-02

## 2022-01-02 RX ORDER — CEPHALEXIN 500 MG/1
500 CAPSULE ORAL ONCE
Status: COMPLETED | OUTPATIENT
Start: 2022-01-02 | End: 2022-01-02

## 2022-01-02 RX ORDER — SULFAMETHOXAZOLE AND TRIMETHOPRIM 800; 160 MG/1; MG/1
1 TABLET ORAL 2 TIMES DAILY
Qty: 14 TABLET | Refills: 0 | Status: SHIPPED | OUTPATIENT
Start: 2022-01-02 | End: 2022-01-09

## 2022-01-02 RX ORDER — NAPROXEN 500 MG/1
500 TABLET ORAL 2 TIMES DAILY PRN
Qty: 14 TABLET | Refills: 0 | Status: SHIPPED | OUTPATIENT
Start: 2022-01-02 | End: 2022-10-27

## 2022-01-02 RX ORDER — NAPROXEN 250 MG/1
500 TABLET ORAL ONCE
Status: COMPLETED | OUTPATIENT
Start: 2022-01-02 | End: 2022-01-02

## 2022-01-02 RX ADMIN — SULFAMETHOXAZOLE AND TRIMETHOPRIM 1 TABLET: 800; 160 TABLET ORAL at 20:20

## 2022-01-02 RX ADMIN — NAPROXEN 500 MG: 250 TABLET ORAL at 20:21

## 2022-01-02 RX ADMIN — CEPHALEXIN 500 MG: 500 CAPSULE ORAL at 20:20

## 2022-01-02 ASSESSMENT — PAIN SCALES - GENERAL: PAINLEVEL_OUTOF10: 6

## 2022-01-03 NOTE — ED NOTES
Notification of Inpatient Admission/Inpatient Authorization Request   This is a Notification of Inpatient Admission for [address]  Be advised that this patient was admitted to our facility under Inpatient Status  Contact Johana Henry at [phone] for additional admission information  [facility] DEPT  DEDICATED -[phone]  Patient Name:   Ant Dolan   YOB: 1954       [address]   [address]:   [address]  Tax ID: [id number]  NPI: [id number] Attending Provider/NPI: Maureen Donald Do [[id number]]   Place of Service Code: 24     Place of Service Name:  [address] [place] [address]   Start Date: 2/25/20 2040     Discharge Date & Time: No discharge date for patient encounter  Type of Admission: Inpatient Status Discharge Disposition   (if discharged): Home/Self Care   Patient Diagnoses: Nausea [R11 0]  Abdominal pain [R10 9]  Nausea and vomiting [R11 2]  Pain of upper abdomen [R10 10]  Diarrhea, unspecified type [R19 7]     Orders: Admission Orders (From admission, onward)     Ordered        02/25/20 2040  Inpatient Admission  Once                    Assigned Utilization Review Contact: Johana Henry  Utilization   Network Utilization Review Department  Phone: [phone]; Fax [phone]  Email: [email or web address]   ATTENTION PAYERS: Please call the assigned Utilization  directly with any questions or concerns ALL voicemails in the department are confidential  Send all requests for admission clinical reviews, approved or denied determinations and any other requests to dedicated fax number belonging to the campus where the patient is receiving treatment  Pt noted with localized erythema and edema to posterior R hand. No open areas or drng noted, circ checks WNL.  ROM slightly limited r/t c/o's pain     Mario Crisostomo RN  01/02/22 2021

## 2022-01-03 NOTE — ED NOTES
Discharge instructions and medications reviewed with patient. Patient verbalized understanding of medications and follow up. All questions answered at this time. Skin warm, pink, and dry. Patient alert and oriented x4. Pt ambulated to lobby with a stable gait. Patient discharged home with 3 prescriptions.       Willo Leventhal, RN  01/02/22 4428

## 2022-01-03 NOTE — ED PROVIDER NOTES
[address] [facility] COMPLAINT  Right hand pain    HISTORY OF PRESENT ILLNESS  Bibiana Mixon is a 36 y.o. male with past medical history of asthma, hepatitis C who presents to the ED complaining of abscess. Bibiana Mixon complains of discomfort of the right hand between 4th and 5th knuckle, that started yesterday. Denies any trauma  But does report some dry cracked skin. The pain is constant and 0 on a scale of 1 - 10 when resting, aching, worse with movement, touching, improves with rest (has not taken anything for symptoms), and does not radiate. Denies fever. Does have history of IVDU, last use 4y ago. Does believe he has a history of MRSA. Feels like when he has had cellulitis in the past.     Old records reviewed: No pertinent information noted. No other complaints, modifying factors or associated symptoms. I have reviewed the following from the nursing documentation. Past Medical History:   Diagnosis Date    Ankle fracture     Asthma      Past Surgical History:   Procedure Laterality Date    TYMPANOSTOMY TUBE PLACEMENT       History reviewed. No pertinent family history.   Social History     Socioeconomic History    Marital status: Single     Spouse name: Not on file    Number of children: Not on file    Years of education: Not on file    Highest education level: Not on file   Occupational History    Not on file   Tobacco Use    Smoking status: Former Smoker     Packs/day: 1.00    Smokeless tobacco: Current User   Vaping Use    Vaping Use: Every day    Substances: Always, THC    Devices: Pre-filled or refillable cartridge   Substance and Sexual Activity    Alcohol use: No    Drug use: No     Comment: Heroin    Sexual activity: Yes     Partners: Female   Other Topics Concern    Not on file   Social History Narrative    Not on file     Social Determinants of Health     Financial Resource Strain:     Difficulty of Paying Living Expenses: Not on file   Food Insecurity:     Worried About Running Out of Food in the Last Year: Not on file    Obdulia of Food in the Last Year: Not on file   Transportation Needs:     Lack of Transportation (Medical): Not on file    Lack of Transportation (Non-Medical): Not on file   Physical Activity:     Days of Exercise per Week: Not on file    Minutes of Exercise per Session: Not on file   Stress:     Feeling of Stress : Not on file   Social Connections:     Frequency of Communication with Friends and Family: Not on file    Frequency of Social Gatherings with Friends and Family: Not on file    Attends Latter-day Services: Not on file    Active Member of [address] [place] [address] [employer] or Organizations: Not on file    Attends Club or Organization Meetings: Not on file    Marital Status: Not on file   Intimate Partner Violence:     Fear of Current or Ex-Partner: Not on file    Emotionally Abused: Not on file    Physically Abused: Not on file    Sexually Abused: Not on file   Housing Stability:     Unable to Pay for Housing in the Last Year: Not on file    Number of [place] in the Last Year: Not on file    Unstable Housing in the Last Year: Not on file     No current facility-administered medications for this encounter. Current Outpatient Medications   Medication Sig Dispense Refill    cephALEXin (KEFLEX) 500 MG capsule Take 1 capsule by mouth 4 times daily for 7 days 28 capsule 0    sulfamethoxazole-trimethoprim (BACTRIM DS;SEPTRA DS) 800-160 MG per tablet Take 1 tablet by mouth 2 times daily for 7 days 14 tablet 0    naproxen (NAPROSYN) 500 MG tablet Take 1 tablet by mouth 2 times daily as needed for Pain 14 tablet 0    buprenorphine-naloxone (SUBOXONE) 8-2 MG FILM SL film Place 2 Film under the tongue.       fluticasone (FLONASE) 50 MCG/ACT nasal spray 1 spray by Each Nostril route daily      Budesonide-Formoterol Fumarate (SYMBICORT IN) Inhale into the lungs       No Known Allergies    REVIEW OF SYSTEMS  All systems reviewed, pertinent positives per HPI otherwise noted to be negative. PHYSICAL EXAM  BP (!) 157/108   Pulse 87   Temp 98.2 °F (36.8 °C) (Oral)   Resp 16   Ht 6' (1.829 m)   Wt 250 lb (113.4 kg)   SpO2 95%   BMI 33.91 kg/m²    GENERAL APPEARANCE: Awake and alert. Cooperative. no distress. HENT: Normocephalic. Atraumatic. Mucous membranes are moist  NECK: Supple. EYES: PERRL. EOM's grossly intact. HEART/CHEST: RRR. No murmurs. LUNGS: Respirations unlabored. CTAB. Good air exchange. Speaking comfortably in full sentences. ABDOMEN: No tenderness. Soft. Non-distended. No masses. No organomegaly. No guarding or rebound. MUSCULOSKELETAL: No extremity edema. Compartments soft. No deformity. No tenderness in the extremities. All extremities neurovascularly intact. DERMATOLOGIC:  The hand over the fourth knuckle has tenderness to palpation. Mild erythema and swelling. No fluctuance or drainage. NEUROLOGICAL: Alert and oriented. CN's 2-12 intact. No gross facial drooping. Strength 5/5, sensation intact. PSYCHIATRIC: Normal mood and affect. LABS  I have reviewed all labs for this visit. No results found for this visit on 01/02/22. RADIOLOGY  The following was independently interpreted by me: Extremities: Hand: Negative. If performed, all other non-plain film images (e.g., CT Scan, Ultrasound) have been interpreted by the on-call radiologist.    XR HAND RIGHT (MIN 3 VIEWS)   Final Result   No acute osseous abnormality identified. ED COURSE/MDM  Patient seen and evaluated. Old records reviewed and pertinent information included in HPI. Labs and imaging reviewed and results discussed with patient. Overall well appearing patient, in no acute distress, presenting for right hand fourth knuckle pain. Physical exam remarkable for tenderness to palpation, mild erythema and swelling.      Differential diagnosis includes but not limited to: Cellulitis, lower suspicion for septic joint, abscess, necrotizing fasciitis, deep space soft tissue bacterial skin infection, malignancy, other    Given the patient was having pain with palpation of the knuckle, x-ray was obtained to assess for any bony abnormality. No evidence of bony abnormality. ED Course as of 01/04/22 1511   Sandra Nolan Jan 02, 2022 2131 There has been a delay in reading of the hand imaging. I looked at myself and did not see any obvious fracture, foreign body, or dislocation. Patient is wishing to leave at this time, we discussed that his imaging has not been evaluated by radiology and could show something that required intervention. Patient still wishes to leave at this time. [ER]      ED Course User Index  [ER] Kishore Oneal MD     X-ray was read by myself prior to patient discharge, confirm negative by radiology. No evidence of fluctuance, crepitus, or other acute abnormality. Low suspicion for abscess or necrotizing soft tissue infection. Low suspicion for septic joint. Patient does have tenderness to palpation, but no significant pain with moving the hand. No evidence of flexor tenosynovitis. Knavel signs negative. Exam overall reassuring, however patient states his current symptoms are very similar to when he has had cellulitis in the past.  We will treat given that patient may have a early cellulitis. Patient has a history of MRSA. During the patient's ED course, the patient was given:  Medications   naproxen (NAPROSYN) tablet 500 mg (500 mg Oral Given 1/2/22 2021)   cephALEXin (KEFLEX) capsule 500 mg (500 mg Oral Given 1/2/22 2020)   sulfamethoxazole-trimethoprim (BACTRIM DS;SEPTRA DS) 800-160 MG per tablet 1 tablet (1 tablet Oral Given 1/2/22 2020)        Evaluation overall reassuring. At this time, feel the patient is appropriate for discharge to follow-up with a primary care doctor. Patient feels comfortable with discharge at this time.   Patient was provided with prescriptions for Bactrim, Keflex, and naproxen. Return precautions given. Encouraged PCP follow-up in 3 days. Patient discharged in stable condition. I estimate there is LOW risk for COMPARTMENT SYNDROME, TENDON OR NEUROVASCULAR INJURY, OR FOREIGN BODY, thus I consider the discharge disposition reasonable. Also, there is no evidence or peritonitis, sepsis, or toxicity. Nickolas Jimenez and I have discussed the diagnosis and risks, and we agree with discharging home to follow-up with their primary doctor. We also discussed returning to the Emergency Department immediately if new or worsening symptoms occur. We have discussed the symptoms which are most concerning (e.g., changing or worsening pain, fever, numbness, weakness, cool or painful digits) that necessitate immediate return. CLINICAL IMPRESSION  1. Right hand pain    2. Cellulitis of right upper extremity        Blood pressure (!) 157/108, pulse 87, temperature 98.2 °F (36.8 °C), temperature source Oral, resp. rate 16, height 6' (1.829 m), weight 250 lb (113.4 kg), SpO2 95 %. DISPOSITION  Nickolas Jimenez was discharged to home in stable condition. Patient was given scripts for the following medications. I counseled patient how to take these medications. Discharge Medication List as of 1/2/2022  9:50 PM      START taking these medications    Details   cephALEXin (KEFLEX) 500 MG capsule Take 1 capsule by mouth 4 times daily for 7 days, Disp-28 capsule, R-0Normal      sulfamethoxazole-trimethoprim (BACTRIM DS;SEPTRA DS) 800-160 MG per tablet Take 1 tablet by mouth 2 times daily for 7 days, Disp-14 tablet, R-0Normal      naproxen (NAPROSYN) 500 MG tablet Take 1 tablet by mouth 2 times daily as needed for Pain, Disp-14 tablet, R-0Normal             Follow-up with:  [facility] ([facility]) Pre-Services  [phone]  Schedule an appointment as soon as possible for a visit       [facility]  [facility]  Isabelle Avitia [address]  Go to   As needed, If symptoms worsen    [address] [facility] [place], [address] [facility] [employer] [address]  [place], [address]  Ph: [phone]  Schedule an appointment as soon as possible for a visit   As needed, If symptoms worsen      DISCLAIMER: This chart was created using Dragon dictation software. Efforts were made by me to ensure accuracy, however some errors may be present due to limitations of this technology and occasionally words are not transcribed correctly.         Shaun Spencer MD  01/04/22 0871

## 2022-10-27 ENCOUNTER — TELEPHONE (OUTPATIENT)
Dept: CARDIOLOGY CLINIC | Age: 32
End: 2022-10-27

## 2022-10-27 ENCOUNTER — APPOINTMENT (OUTPATIENT)
Dept: CT IMAGING | Age: 32
End: 2022-10-27
Payer: COMMERCIAL

## 2022-10-27 ENCOUNTER — HOSPITAL ENCOUNTER (EMERGENCY)
Age: 32
Discharge: HOME | End: 2022-10-27
Attending: STUDENT IN AN ORGANIZED HEALTH CARE EDUCATION/TRAINING PROGRAM
Payer: COMMERCIAL

## 2022-10-27 ENCOUNTER — APPOINTMENT (OUTPATIENT)
Dept: GENERAL RADIOLOGY | Age: 32
End: 2022-10-27
Payer: COMMERCIAL

## 2022-10-27 VITALS
HEIGHT: 72 IN | RESPIRATION RATE: 17 BRPM | WEIGHT: 270 LBS | TEMPERATURE: 98.3 F | HEART RATE: 62 BPM | BODY MASS INDEX: 36.57 KG/M2 | DIASTOLIC BLOOD PRESSURE: 73 MMHG | SYSTOLIC BLOOD PRESSURE: 123 MMHG | OXYGEN SATURATION: 94 %

## 2022-10-27 DIAGNOSIS — R42 LIGHT-HEADEDNESS: Primary | ICD-10-CM

## 2022-10-27 DIAGNOSIS — R00.1 SINUS BRADYCARDIA: ICD-10-CM

## 2022-10-27 DIAGNOSIS — R10.13 ABDOMINAL PAIN, EPIGASTRIC: Primary | ICD-10-CM

## 2022-10-27 LAB
A/G RATIO: 2.3 (ref 1.1–2.2)
ALBUMIN SERPL-MCNC: 4.4 G/DL (ref 3.4–5)
ALP BLD-CCNC: 70 U/L (ref 40–129)
ALT SERPL-CCNC: 17 U/L (ref 10–40)
AMPHETAMINE SCREEN, URINE: ABNORMAL
ANION GAP SERPL CALCULATED.3IONS-SCNC: 13 MMOL/L (ref 3–16)
AST SERPL-CCNC: 16 U/L (ref 15–37)
BARBITURATE SCREEN URINE: ABNORMAL
BASOPHILS ABSOLUTE: 0 K/UL (ref 0–0.2)
BASOPHILS RELATIVE PERCENT: 0.4 %
BENZODIAZEPINE SCREEN, URINE: ABNORMAL
BILIRUB SERPL-MCNC: 1 MG/DL (ref 0–1)
BILIRUBIN URINE: NEGATIVE
BLOOD, URINE: NEGATIVE
BUN BLDV-MCNC: 12 MG/DL (ref 7–20)
CALCIUM SERPL-MCNC: 8.9 MG/DL (ref 8.3–10.6)
CANNABINOID SCREEN URINE: POSITIVE
CHLORIDE BLD-SCNC: 103 MMOL/L (ref 99–110)
CLARITY: CLEAR
CO2: 25 MMOL/L (ref 21–32)
COCAINE METABOLITE SCREEN URINE: ABNORMAL
COLOR: YELLOW
CREAT SERPL-MCNC: 0.8 MG/DL (ref 0.9–1.3)
EKG ATRIAL RATE: 37 BPM
EKG ATRIAL RATE: 46 BPM
EKG DIAGNOSIS: NORMAL
EKG DIAGNOSIS: NORMAL
EKG P AXIS: 57 DEGREES
EKG P-R INTERVAL: 152 MS
EKG P-R INTERVAL: 88 MS
EKG Q-T INTERVAL: 488 MS
EKG Q-T INTERVAL: 500 MS
EKG QRS DURATION: 92 MS
EKG QRS DURATION: 94 MS
EKG QTC CALCULATION (BAZETT): 392 MS
EKG QTC CALCULATION (BAZETT): 427 MS
EKG R AXIS: 60 DEGREES
EKG R AXIS: 71 DEGREES
EKG T AXIS: 50 DEGREES
EKG T AXIS: 53 DEGREES
EKG VENTRICULAR RATE: 37 BPM
EKG VENTRICULAR RATE: 46 BPM
EOSINOPHILS ABSOLUTE: 0.2 K/UL (ref 0–0.6)
EOSINOPHILS RELATIVE PERCENT: 2.4 %
FENTANYL SCREEN, URINE: ABNORMAL
GFR SERPL CREATININE-BSD FRML MDRD: >60 ML/MIN/{1.73_M2}
GLUCOSE BLD-MCNC: 111 MG/DL (ref 70–99)
GLUCOSE URINE: NEGATIVE MG/DL
HCT VFR BLD CALC: 42.8 % (ref 40.5–52.5)
HEMOGLOBIN: 14.7 G/DL (ref 13.5–17.5)
KETONES, URINE: NEGATIVE MG/DL
LACTIC ACID: 1 MMOL/L (ref 0.4–2)
LEUKOCYTE ESTERASE, URINE: NEGATIVE
LIPASE: 32 U/L (ref 13–60)
LYMPHOCYTES ABSOLUTE: 1.3 K/UL (ref 1–5.1)
LYMPHOCYTES RELATIVE PERCENT: 16.3 %
Lab: ABNORMAL
MAGNESIUM: 1.7 MG/DL (ref 1.8–2.4)
MCH RBC QN AUTO: 31.2 PG (ref 26–34)
MCHC RBC AUTO-ENTMCNC: 34.3 G/DL (ref 31–36)
MCV RBC AUTO: 91 FL (ref 80–100)
METHADONE SCREEN, URINE: ABNORMAL
MICROSCOPIC EXAMINATION: NORMAL
MONOCYTES ABSOLUTE: 0.7 K/UL (ref 0–1.3)
MONOCYTES RELATIVE PERCENT: 8.4 %
NEUTROPHILS ABSOLUTE: 5.8 K/UL (ref 1.7–7.7)
NEUTROPHILS RELATIVE PERCENT: 72.5 %
NITRITE, URINE: NEGATIVE
OPIATE SCREEN URINE: ABNORMAL
OXYCODONE URINE: ABNORMAL
PDW BLD-RTO: 12.6 % (ref 12.4–15.4)
PH UA: 5.5
PH UA: 5.5 (ref 5–8)
PHENCYCLIDINE SCREEN URINE: ABNORMAL
PLATELET # BLD: 260 K/UL (ref 135–450)
PMV BLD AUTO: 7.8 FL (ref 5–10.5)
POTASSIUM REFLEX MAGNESIUM: 3.8 MMOL/L (ref 3.5–5.1)
PROTEIN UA: NEGATIVE MG/DL
RBC # BLD: 4.7 M/UL (ref 4.2–5.9)
SODIUM BLD-SCNC: 141 MMOL/L (ref 136–145)
SPECIFIC GRAVITY UA: >=1.03 (ref 1–1.03)
TOTAL PROTEIN: 6.3 G/DL (ref 6.4–8.2)
TROPONIN: <0.01 NG/ML
TROPONIN: <0.01 NG/ML
URINE TYPE: NORMAL
UROBILINOGEN, URINE: 0.2 E.U./DL
WBC # BLD: 8 K/UL (ref 4–11)

## 2022-10-27 PROCEDURE — 80053 COMPREHEN METABOLIC PANEL: CPT

## 2022-10-27 PROCEDURE — 96366 THER/PROPH/DIAG IV INF ADDON: CPT

## 2022-10-27 PROCEDURE — 93010 ELECTROCARDIOGRAM REPORT: CPT | Performed by: INTERNAL MEDICINE

## 2022-10-27 PROCEDURE — 96365 THER/PROPH/DIAG IV INF INIT: CPT

## 2022-10-27 PROCEDURE — 99285 EMERGENCY DEPT VISIT HI MDM: CPT

## 2022-10-27 PROCEDURE — 71275 CT ANGIOGRAPHY CHEST: CPT

## 2022-10-27 PROCEDURE — 36415 COLL VENOUS BLD VENIPUNCTURE: CPT

## 2022-10-27 PROCEDURE — 84484 ASSAY OF TROPONIN QUANT: CPT

## 2022-10-27 PROCEDURE — 85025 COMPLETE CBC W/AUTO DIFF WBC: CPT

## 2022-10-27 PROCEDURE — 81003 URINALYSIS AUTO W/O SCOPE: CPT

## 2022-10-27 PROCEDURE — 6360000002 HC RX W HCPCS: Performed by: STUDENT IN AN ORGANIZED HEALTH CARE EDUCATION/TRAINING PROGRAM

## 2022-10-27 PROCEDURE — 80307 DRUG TEST PRSMV CHEM ANLYZR: CPT

## 2022-10-27 PROCEDURE — 96361 HYDRATE IV INFUSION ADD-ON: CPT

## 2022-10-27 PROCEDURE — 83735 ASSAY OF MAGNESIUM: CPT

## 2022-10-27 PROCEDURE — 2580000003 HC RX 258: Performed by: STUDENT IN AN ORGANIZED HEALTH CARE EDUCATION/TRAINING PROGRAM

## 2022-10-27 PROCEDURE — 96375 TX/PRO/DX INJ NEW DRUG ADDON: CPT

## 2022-10-27 PROCEDURE — 83690 ASSAY OF LIPASE: CPT

## 2022-10-27 PROCEDURE — 83605 ASSAY OF LACTIC ACID: CPT

## 2022-10-27 PROCEDURE — 6360000004 HC RX CONTRAST MEDICATION: Performed by: STUDENT IN AN ORGANIZED HEALTH CARE EDUCATION/TRAINING PROGRAM

## 2022-10-27 PROCEDURE — 93005 ELECTROCARDIOGRAM TRACING: CPT | Performed by: STUDENT IN AN ORGANIZED HEALTH CARE EDUCATION/TRAINING PROGRAM

## 2022-10-27 RX ORDER — SUCRALFATE 1 G/1
1 TABLET ORAL 4 TIMES DAILY
Qty: 120 TABLET | Refills: 3 | Status: SHIPPED | OUTPATIENT
Start: 2022-10-27

## 2022-10-27 RX ORDER — 0.9 % SODIUM CHLORIDE 0.9 %
1000 INTRAVENOUS SOLUTION INTRAVENOUS ONCE
Status: COMPLETED | OUTPATIENT
Start: 2022-10-27 | End: 2022-10-27

## 2022-10-27 RX ORDER — MONTELUKAST SODIUM 10 MG/1
10 TABLET ORAL NIGHTLY
COMMUNITY

## 2022-10-27 RX ORDER — FLUTICASONE PROPIONATE 110 UG/1
1 AEROSOL, METERED RESPIRATORY (INHALATION) 2 TIMES DAILY
COMMUNITY

## 2022-10-27 RX ORDER — ONDANSETRON 2 MG/ML
4 INJECTION INTRAMUSCULAR; INTRAVENOUS ONCE
Status: COMPLETED | OUTPATIENT
Start: 2022-10-27 | End: 2022-10-27

## 2022-10-27 RX ORDER — KETOROLAC TROMETHAMINE 30 MG/ML
15 INJECTION, SOLUTION INTRAMUSCULAR; INTRAVENOUS ONCE
Status: COMPLETED | OUTPATIENT
Start: 2022-10-27 | End: 2022-10-27

## 2022-10-27 RX ORDER — CITALOPRAM 40 MG/1
40 TABLET ORAL DAILY
COMMUNITY

## 2022-10-27 RX ORDER — ACETAMINOPHEN 325 MG/1
650 TABLET ORAL ONCE
Status: DISCONTINUED | OUTPATIENT
Start: 2022-10-27 | End: 2022-10-27 | Stop reason: HOSPADM

## 2022-10-27 RX ORDER — PANTOPRAZOLE SODIUM 20 MG/1
20 TABLET, DELAYED RELEASE ORAL DAILY
Qty: 30 TABLET | Refills: 3 | Status: SHIPPED | OUTPATIENT
Start: 2022-10-27

## 2022-10-27 RX ORDER — MAGNESIUM SULFATE IN WATER 40 MG/ML
2000 INJECTION, SOLUTION INTRAVENOUS ONCE
Status: COMPLETED | OUTPATIENT
Start: 2022-10-27 | End: 2022-10-27

## 2022-10-27 RX ADMIN — MAGNESIUM SULFATE HEPTAHYDRATE 2000 MG: 40 INJECTION, SOLUTION INTRAVENOUS at 11:47

## 2022-10-27 RX ADMIN — KETOROLAC TROMETHAMINE 15 MG: 30 INJECTION, SOLUTION INTRAMUSCULAR at 10:56

## 2022-10-27 RX ADMIN — ONDANSETRON HYDROCHLORIDE 4 MG: 2 INJECTION, SOLUTION INTRAMUSCULAR; INTRAVENOUS at 10:56

## 2022-10-27 RX ADMIN — IOPAMIDOL 75 ML: 755 INJECTION, SOLUTION INTRAVENOUS at 11:13

## 2022-10-27 RX ADMIN — SODIUM CHLORIDE 1000 ML: 9 INJECTION, SOLUTION INTRAVENOUS at 10:55

## 2022-10-27 ASSESSMENT — PAIN - FUNCTIONAL ASSESSMENT
PAIN_FUNCTIONAL_ASSESSMENT: NONE - DENIES PAIN

## 2022-10-27 NOTE — TELEPHONE ENCOUNTER
Monitor placed by [employer]  Length of monitor 7 days  Monitor ordered by [facility]  Serial number P#[id number]  Kit ID N/A  Activation successful prior to pt leaving office? Yes     Pt's wife was also present in the room while monitor was being applied. All questions were answered.

## 2022-10-27 NOTE — ED NOTES
No viscus lidocaine currently @ this facility.  [facility] pharmacist, Elpidio Maldonado made aware     Shana Edmondson RN  10/27/22 3403

## 2022-10-27 NOTE — DISCHARGE INSTRUCTIONS
When she did call both the cardiologist and the GI physician today as well as your primary doctor to set up follow-up appointment soon as able. I talked to the cardiologist and he reviewed the laboratory evaluation as well as your EKG and believes you can go directly to his office to get a cardiac monitor placed for 7 days. Return to emergency department for any chest pain, shortness of breath, lightheadedness or dizziness or any new changing or worsening symptoms. I also want to return for any worsening epigastric abdominal pain, uncontrolled nausea vomiting or any new change or worsening symptoms. I do want you to start taking Protonix as well as Carafate for your abdominal pain.

## 2022-10-31 ENCOUNTER — TELEPHONE (OUTPATIENT)
Dept: CARDIOLOGY CLINIC | Age: 32
End: 2022-10-31

## 2022-10-31 NOTE — TELEPHONE ENCOUNTER
Pt and his wife came into [address] [place] [address] wanting to schedule an appt. Pt was seen in [place] [place] ED on 10/27/22 for Bradycardia w/ HR upon arrival @ 34bpm. Pt came to [address] [place] [address] on 10/27 to get a 7 day [employer] placed, wife sts that since that the pt's HR gets in the 42's while @ rest and does rise into the 70-80's if he is playing with their daughter. No syncopal episode since but does get cold sweats when Makeda Ruiz. Please advise on an appt date and time for pt to see EP as a new pt. Please contact pt with this information.  Thank you

## 2022-10-31 NOTE — TELEPHONE ENCOUNTER
Marcos Rachel MD  You; Ama Torres MA; Holly Marshall RN 1 minute ago (3:06 PM)     [facility]  I'm happy to see patient this Wednesday Nov 2nd at 9:30 AM if he is available ( I don't have formal clinic on Wednesday but happy to see him). Given symptoms and reported findings, it is best to get him evaluated sooner rather than later.      Thank you    Please reach out and add this patient to [facility] schedule per his request.

## 2022-10-31 NOTE — ED PROVIDER NOTES
Emergency Department Encounter    Patient: Nasra Kent  MRN: [id number]  : 1990  Date of Evaluation: 10/31/2022  ED Provider:  Сергей Lala MD    Triage Chief Complaint:   Abdominal Pain (C/o's BUQ abd pain with N/V/D x approx 2-3 hrs PTA)    [place]:  Nasra Kent is a 29 y.o. male presenting with complaints of upper abdominal pain. Patient states for the past 2 to 3 hours he has had severe upper abdominal pain. States his 10 at 10, constant, stabbing, worse with palpation. States he has had some nonbilious nonbloody vomiting as well as loose stools without blood or melena. He denies any urinary symptoms include dysuria, increased frequency, urgency, hematuria. Denies any recent falls or trauma. Denies fevers or chills. Denies chest pain or shortness of breath cough or sputum production. Patient is diaphoretic on presentation. He denies any headache, blurred vision, focal neurodeficits, motor or sensory changes, neck or back pain. Denies rashes or lesions. Denies alcohol use. States he has a previous heroin abuse history but no longer uses. States he does use marijuana currently but denies any other drug use currently. ROS - see HPI, below listed is current ROS at time of my eval:  At least 14 systems reviewed, negative other HPI    Past Medical History:   Diagnosis Date    Ankle fracture     Asthma     Hepatitis-C      Past Surgical History:   Procedure Laterality Date    TYMPANOSTOMY TUBE PLACEMENT       History reviewed. No pertinent family history.   Social History     Socioeconomic History    Marital status:      Spouse name: Not on file    Number of children: Not on file    Years of education: Not on file    Highest education level: Not on file   Occupational History    Not on file   Tobacco Use    Smoking status: Former     Packs/day: 1.00     Types: Cigarettes    Smokeless tobacco: Current   Vaping Use    Vaping Use: Every day    Substances: Always, THC    Devices: Pre-filled or refillable cartridge   Substance and Sexual Activity    Alcohol use: No    Drug use: Yes     Types: Marijuana Jurbenjamin Bliss)     Comment: Hx of heroin abuse    Sexual activity: Yes     Partners: Female   Other Topics Concern    Not on file   Social History Narrative    Not on file     Social Determinants of Health     Financial Resource Strain: Not on file   Food Insecurity: Not on file   Transportation Needs: Not on file   Physical Activity: Not on file   Stress: Not on file   Social Connections: Not on file   Intimate Partner Violence: Not on file   Housing Stability: Not on file     No current facility-administered medications for this encounter. Current Outpatient Medications   Medication Sig Dispense Refill    montelukast (SINGULAIR) 10 MG tablet Take 10 mg by mouth nightly      citalopram (CELEXA) 40 MG tablet Take 40 mg by mouth daily      fluticasone (FLOVENT HFA) 110 MCG/ACT inhaler Inhale 1 puff into the lungs 2 times daily      pantoprazole (PROTONIX) 20 MG tablet Take 1 tablet by mouth daily 30 tablet 3    sucralfate (CARAFATE) 1 GM tablet Take 1 tablet by mouth 4 times daily 120 tablet 3    buprenorphine-naloxone (SUBOXONE) 8-2 MG FILM SL film Place 2 Film under the tongue. States he takes 16 mg daily      fluticasone (FLONASE) 50 MCG/ACT nasal spray 1 spray by Each Nostril route daily       No Known Allergies    Nursing Notes Reviewed    Physical Exam:  Triage VS:    ED Triage Vitals [10/27/22 1047]   Enc Vitals Group      BP (!) 147/99      Heart Rate (!) 34      Resp 18      Temp 97.7 °F (36.5 °C)      Temp Source Oral      SpO2 99 %      Weight 270 lb (122.5 kg)      Height 6' (1.829 m)      Head Circumference       Peak Flow       Pain Score       Pain Loc       Pain Edu? Excl. in [address]? My pulse ox interpretation is - normal    General appearance: Diaphoretic  Skin:  Warm. Dry. Eye:  Extraocular movements intact.      Ears, nose, mouth and throat:  Oral mucosa moist   Neck: Trachea midline. Extremity:  No swelling. Normal ROM     Heart: Bradycardic, regular rhythm, normal S1 & S2, no extra heart sounds. Perfusion:  intact  Respiratory:  Lungs clear to auscultation bilaterally. Respirations nonlabored. Abdominal:  Normal bowel sounds. Soft. Tenderness palpation diffusely in the upper abdomen with voluntary guarding without rebound, no tenderness palpation of the lower abdomen, no flank pain, no CVA tenderness, no central spinal tenderness  Back:  No CVA tenderness to palpation     Neurological:  Alert and oriented times 3. No focal neuro deficits.              Psychiatric:  Appropriate    I have reviewed and interpreted all of the currently available lab results from this visit (if applicable):  Results for orders placed or performed during the hospital encounter of 10/27/22   Urinalysis   Result Value Ref Range    Color, UA Yellow Straw/Yellow    Clarity, UA Clear Clear    Glucose, Ur Negative Negative mg/dL    Bilirubin Urine Negative Negative    Ketones, Urine Negative Negative mg/dL    Specific Gravity, UA >=1.030 1.005 - 1.030    Blood, Urine Negative Negative    pH, UA 5.5 5.0 - 8.0    Protein, UA Negative Negative mg/dL    Urobilinogen, Urine 0.2 <2.0 E.U./dL    Nitrite, Urine Negative Negative    Leukocyte Esterase, Urine Negative Negative    Microscopic Examination Not Indicated     Urine Type NotGiven    Troponin   Result Value Ref Range    Troponin <0.01 <0.01 ng/mL   CBC with Auto Differential   Result Value Ref Range    WBC 8.0 4.0 - 11.0 K/uL    RBC 4.70 4.20 - 5.90 M/uL    Hemoglobin 14.7 13.5 - 17.5 g/dL    Hematocrit 42.8 40.5 - 52.5 %    MCV 91.0 80.0 - 100.0 fL    MCH 31.2 26.0 - 34.0 pg    MCHC 34.3 31.0 - 36.0 g/dL    RDW 12.6 12.4 - 15.4 %    Platelets 437 001 - 002 K/uL    MPV 7.8 5.0 - 10.5 fL    Neutrophils % 72.5 %    Lymphocytes % 16.3 %    Monocytes % 8.4 %    Eosinophils % 2.4 %    Basophils % 0.4 %    Neutrophils Absolute 5.8 1.7 - 7.7 K/uL Lymphocytes Absolute 1.3 1.0 - 5.1 K/uL    Monocytes Absolute 0.7 0.0 - 1.3 K/uL    Eosinophils Absolute 0.2 0.0 - 0.6 K/uL    Basophils Absolute 0.0 0.0 - 0.2 K/uL   Comprehensive Metabolic Panel w/ Reflex to MG   Result Value Ref Range    Sodium 141 136 - 145 mmol/L    Potassium reflex Magnesium 3.8 3.5 - 5.1 mmol/L    Chloride 103 99 - 110 mmol/L    CO2 25 21 - 32 mmol/L    Anion Gap 13 3 - 16    Glucose 111 (H) 70 - 99 mg/dL    BUN 12 7 - 20 mg/dL    Creatinine 0.8 (L) 0.9 - 1.3 mg/dL    Est, Glom Filt Rate >60 >60    Calcium 8.9 8.3 - 10.6 mg/dL    Total Protein 6.3 (L) 6.4 - 8.2 g/dL    Albumin 4.4 3.4 - 5.0 g/dL    Albumin/Globulin Ratio 2.3 (H) 1.1 - 2.2    Total Bilirubin 1.0 0.0 - 1.0 mg/dL    Alkaline Phosphatase 70 40 - 129 U/L    ALT 17 10 - 40 U/L    AST 16 15 - 37 U/L   Lipase   Result Value Ref Range    Lipase 32.0 13.0 - 60.0 U/L   Lactic Acid   Result Value Ref Range    Lactic Acid 1.0 0.4 - 2.0 mmol/L   Magnesium   Result Value Ref Range    Magnesium 1.70 (L) 1.80 - 2.40 mg/dL   Drug screen multi urine   Result Value Ref Range    Amphetamine Screen, Urine Neg Negative <1000ng/mL    Barbiturate Screen, Ur Neg Negative <200 ng/mL    Benzodiazepine Screen, Urine Neg Negative <200 ng/mL    Cannabinoid Scrn, Ur POSITIVE (A) Negative <50 ng/mL    Cocaine Metabolite Screen, Urine Neg Negative <300 ng/mL    Opiate Scrn, Ur Neg Negative <300 ng/mL    PCP Screen, Urine Neg Negative <25 ng/mL    Methadone Screen, Urine Neg Negative <300 ng/mL    Oxycodone Urine Neg Negative <100 ng/ml    FENTANYL SCREEN, URINE Neg Negative <5 ng/mL    pH, UA 5.5     Drug Screen Comment: see below    Troponin   Result Value Ref Range    Troponin <0.01 <0.01 ng/mL   EKG 12 Lead   Result Value Ref Range    Ventricular Rate 37 BPM    Atrial Rate 37 BPM    P-R Interval 88 ms    QRS Duration 92 ms    Q-T Interval 500 ms    QTc Calculation (Bazett) 392 ms    R Axis 71 degrees    T Axis 53 degrees    Diagnosis       Marked sinus bradycardia with short MD junctional escape beats. Abnormal ECGNo previous ECGs availableConfirmed by Sapphire Orozco MD, [address] ([id number]) on 10/27/2022 5:45:56 PM   EKG 12 Lead   Result Value Ref Range    Ventricular Rate 46 BPM    Atrial Rate 46 BPM    P-R Interval 152 ms    QRS Duration 94 ms    Q-T Interval 488 ms    QTc Calculation (Bazett) 427 ms    P Axis 57 degrees    R Axis 60 degrees    T Axis 50 degrees    Diagnosis       Sinus bradycardiaOtherwise normal ECGWhen compared with ECG of 27-OCT-2022 10:40, (unconfirmed)No significant change was foundConfirmed by Sapphire Orozco MD, [address] ([id number]) on 10/27/2022 5:46:01 PM      Radiographs (if obtained):  Radiologist's Report Reviewed:  CTA CHEST ABDOMEN PELVIS W CONTRAST    Result Date: 10/27/2022  EXAMINATION: CTA OF THE CHEST, ABDOMEN AND PELVIS WITH CONTRAST 10/27/2022 11:03 am: TECHNIQUE: CTA of the chest, abdomen and pelvis was performed after the administration of intravenous contrast.  Multiplanar reformatted images are provided for review. MIP images are provided for review. Automated exposure control, iterative reconstruction, and/or weight based adjustment of the mA/kV was utilized to reduce the radiation dose to as low as reasonably achievable. COMPARISON: 01/03/2021 and 08/05/2013 HISTORY: ORDERING SYSTEM PROVIDED HISTORY: upper abdominal pain, diaphoretic TECHNOLOGIST PROVIDED HISTORY: Reason for exam:->upper abdominal pain, diaphoretic Decision Support Exception - unselect if not a suspected or confirmed emergency medical condition->Emergency Medical Condition (MA) Reason for Exam: Abdominal pain FINDINGS: Chest: Mediastinum: Motion artifact degrades image quality. The heart is unremarkable. .  There are no enlarged thoracic lymph nodes. The aorta is normal in course and caliber without evidence of dissection. Lungs/pleura: The airway is patent. There is no pneumothorax or pleural effusion. There is dependent bibasilar atelectasis. Soft Tissues/Bones:  The osseous structures are unremarkable Abdomen/Pelvis: Organs: The liver, gallbladder, spleen, pancreas, adrenal glands and kidneys are unremarkable. GI/Bowel: There is no bowel obstruction. The appendix is within normal limits. Pelvis: The pelvic viscera are within normal limits. Peritoneum/Retroperitoneum: A trace amount of pelvic ascites is noted. There is no adenopathy. The aorta is normal in course and caliber without evidence of dissection or hemodynamic stenosis. Bones/Soft Tissues: Degenerative changes involve the lumbar spine. 1. Trace pelvic ascites. EKG (if obtained): (All EKG's are interpreted by myself in the absence of a cardiologist)    First EKG: Sinus bradycardia with short VT junction escape beats, ventricular rate 37, VT interval 88, QRS duration 92, QTc 392, no significant ST elevation or depression    Second EKG: Sinus bradycardia, ventricular rate 46, VT interval 152, QRS duration 94, QTc 427, no significant ST elevation or depression    MDM:    72-year-old male presenting with history seen above. Vitals on presentation patient is significantly bradycardic. Patient is diaphoretic. States he has significant upper abdominal pain. States he does have some lightheadedness. History and physical can be seen above. CBC reveals no leukocytosis. Hemoglobin is within normal limits. CMP is overall reassuring. Lipase is nonelevated. Lactate is within normal limits. Magnesium is 1.7 replaced in the ED. UA not concerning for infection. EKG can be seen above. Trope x2 are negative. Drug screen is negative. I discussed with patient that he will need a TSH outpatient but that is a send out lab at [facility]. Patient was significantly diaphoretic and bradycardic on presentation holding his upper abdomen and state it was acute in onset. I did obtain a CTA chest abdomen pelvis which was unremarkable other than trace pelvic ascites. Patient was given fluids, pain control in the ED. Patient was monitored for several hours. Patient states his symptoms have significantly improved and denies any significant abdominal discomfort, lightheadedness, dizziness. Patient continues to be mildly bradycardic and looking back at previous encounters patient is normally within normal limits as far as heart rate. I did discuss with cardiology discussed admission versus close outpatient follow-up. He states that patient is not currently lightheaded or dizzy patient symptoms have improved that patient can come directly to the clinic for monitor placement patient can have close follow-up. Patient states he feels safe with discharge and I discussed strict return precautions, close follow-up with GI as well as cardiology as well as his primary doctor and patient agreeable to plan and discharged home. Clinical Impression:  1. Abdominal pain, epigastric    2. Sinus bradycardia      Disposition referral (if applicable):    Call your GI physician today to set up follow-up appointment soon as able for reevaluation for possible scope    If symptoms worsen    [facility]  [address] [place] [address], [address] [place] [address]  [phone]      Go directly to the medical office building near [facility] to get cardiac monitor placed    [facility]) Pre-Services  [phone]        Disposition medications (if applicable):  Discharge Medication List as of 10/27/2022  2:27 PM        START taking these medications    Details   pantoprazole (PROTONIX) 20 MG tablet Take 1 tablet by mouth daily, Disp-30 tablet, R-3Normal      sucralfate (CARAFATE) 1 GM tablet Take 1 tablet by mouth 4 times daily, Disp-120 tablet, R-3Normal           ED Provider Disposition Time  DISPOSITION Decision To Discharge 10/27/2022 02:19:04 PM      Comment: Please note this report has been produced using speech recognition software and may contain errors related to that system including errors in grammar, punctuation, and spelling, as well as words and phrases that may be inappropriate. Efforts were made to edit the dictations.         Kesha Suarez MD  10/31/22 0941

## 2022-11-01 NOTE — PROGRESS NOTES
Assessment:     1. Symptomatic bradycardia: patient presented to ED last week with abdominal pain. Was not having any significant cardiac symptoms at the time. Noted to have marked sinus bradycardia and intermittently what appeared to be junctional bradycardia (HR in 30's). He has been having some issues with dizziness over the past few month. Has not had syncope. Today, he has mild sinus bradycardia. Blood pressure stable. Asymptomatic today. Suspect that either his marijuana use or his use of buprenorphine/naloxone may be responsible for his bradycardia. Wife has noted that the addition of naloxone recently has caused more episodes of dizziness. He will attempt to stop use marijuana while speaking to his physician about avoiding naloxone component to see if heart rate responds (bradycardia reported as a rare side effect in literature). We will extend his event monitor and will obtain trans-thoracic echocardiogram to assess for any associated structural or functional abnormalities. Would recommend checking TSH with next set of labs. 2. Obesity: with possible obstructive sleep apnea. Strongly recommended diet and exercise to lose weight. 3. Good blood pressure control    Plan:     Extend cardiac event monitor for 1 week  Echocardiogram to assess heart structure and function  Follow up with EPNP in 1 month    Subjective:     Patient ID: Milady Coronel is a 29 y.o. male. Chief Complaint:  Chief Complaint   Patient presents with    New Patient    Irregular Heart Beat     HPI    Patient is a pleasant 29 y.o. male who presents for evaluation of sinus bradycardia. The patient presented to the emergency department on 10/27/2022 with complaints of upper abdominal pain. He reports that the pain was severe and was consistent for 2 to 3 hours prior to presenting to the emergency department. He presented diaphoretic, but denied dizziness.  His initial ECG in the ED demonstrated sinus bradycardia with short MN junctional escape beats (37 BPM). A repeat ECG showed sinus bradycardia (46 BPM). He was discharged home wearing a cardiac event monitor and instructed to follow up as outpatient. Emergency Department Evaluation (10/27/2022)  Ramos Cohen is a 29 y.o. male presenting with complaints of upper abdominal pain. Patient states for the past 2 to 3 hours he has had severe upper abdominal pain. States his 10 at 10, constant, stabbing, worse with palpation. States he has had some nonbilious nonbloody vomiting as well as loose stools without blood or melena. He denies any urinary symptoms include dysuria, increased frequency, urgency, hematuria. Denies any recent falls or trauma. Denies fevers or chills. Denies chest pain or shortness of breath cough or sputum production. Patient is diaphoretic on presentation. He denies any headache, blurred vision, focal neurodeficits, motor or sensory changes, neck or back pain. Denies rashes or lesions. Denies alcohol use. States he has a previous heroin abuse history but no longer uses. States he does use marijuana currently but denies any other drug use currently. Office Visit ([address], 11/02/2022)  Today he presents with his wife to discuss bradycardia. He states that he was suffering from severe abdominal pain so he went to the emergency room for pain relief. At the emergency room they discovered he and a slow heart rate. He states that he does have episodes of dizziness where he feels like he is going to pass out, but he does not. He states that he is supposed to undergo an EGD but he is unable to because of the bradycardia. His wife reports that they switched him from subutex during hepatitis C treatment to suboxone and this is when she has noticed the episodes of dizziness, diaphoresis, and a \"blank\" appearance to the patient. He has been experiencing fatigue. His wife reports that he snores at night.  He states he has gained 30 - 40 pounds over the last 6 months due to unemployment. Patient denies current edema, chest pain, shortness of breath, palpitations, or syncope. He denies any recent issues with bleeding or bruising. He states he drinks 2- 3 pops and 2-3 cups of coffee daily. He denies alcohol usage. He states he does vape daily. He reports that he has formerly used IV heroin and methamphetamines. He is now taking Suboxone in recovery. He does currently smoke marijuana daily. He states that he has hepatitis C and has undergone treatment. He denies a family history of cardiac issues, but he was adopted, so he does not know the full history. Review of Systems  Review of Systems   Constitutional: Positive for malaise/fatigue and weight gain. Negative for weight loss. HENT:  Negative for nosebleeds and stridor. Eyes:  Negative for vision loss in left eye and vision loss in right eye. Cardiovascular:  Positive for near-syncope. Negative for chest pain, dyspnea on exertion, leg swelling and syncope. Respiratory:  Positive for snoring. Negative for shortness of breath and wheezing. Hematologic/Lymphatic: Negative for bleeding problem. Does not bruise/bleed easily. Skin:  Negative for itching and rash. Musculoskeletal:  Negative for joint pain and joint swelling. Gastrointestinal:  Negative for hematemesis and hematochezia. Genitourinary:  Negative for dysuria and hematuria. Neurological:  Positive for dizziness. Negative for light-headedness. Psychiatric/Behavioral:  Negative for altered mental status. The patient is not nervous/anxious.         Past Medical History:   Diagnosis Date    Ankle fracture     Asthma     Hepatitis-C          Social History     Socioeconomic History    Marital status:      Spouse name: Not on file    Number of children: Not on file    Years of education: Not on file    Highest education level: Not on file   Occupational History    Not on file   Tobacco Use    Smoking status: Former Packs/day: 1.00     Types: Cigarettes    Smokeless tobacco: Current   Vaping Use    Vaping Use: Every day    Substances: Always, THC    Devices: Pre-filled or refillable cartridge   Substance and Sexual Activity    Alcohol use: No    Drug use: Yes     Types: Marijuana Rachna Palm)     Comment: Hx of heroin abuse    Sexual activity: Yes     Partners: Female   Other Topics Concern    Not on file   Social History Narrative    Not on file     Social Determinants of Health     Financial Resource Strain: Not on file   Food Insecurity: Not on file   Transportation Needs: Not on file   Physical Activity: Not on file   Stress: Not on file   Social Connections: Not on file   Intimate Partner Violence: Not on file   Housing Stability: Not on file         History reviewed. No pertinent family history. Objective:     /74   Pulse 58   Ht 6' (1.829 m)   Wt 272 lb (123.4 kg)   SpO2 95%   BMI 36.89 kg/m²     Physical Exam  Constitutional:       Appearance: Normal appearance. HENT:      Head: Normocephalic and atraumatic. Nose: Nose normal. No rhinorrhea. Eyes:      General: No scleral icterus. Conjunctiva/sclera: Conjunctivae normal.   Cardiovascular:      Rate and Rhythm: Normal rate and regular rhythm. Pulmonary:      Effort: Pulmonary effort is normal.      Breath sounds: Normal breath sounds. Abdominal:      General: There is no distension. Musculoskeletal:         General: Normal range of motion. Cervical back: Normal range of motion and neck supple. Skin:     General: Skin is warm and dry. Neurological:      General: No focal deficit present. Mental Status: He is alert and oriented to person, place, and time.    Psychiatric:         Mood and Affect: Mood normal.         Behavior: Behavior normal.       ECG Interpretation:  (Date: 11/02/2022)  Rhythm: Sinus Bradycardia  Rate: 54 BPM  PAC's / PVC's present: None          ECG Interpretation:  (Date: 10/27/2022)  Rhythm: Sinus Bradycardia  Rate: 46 BPM  PAC's / PVC's present: None        ECG Interpretation:  (Date: 11/01/2022)  Rhythm: Sinus Bradycardia with short UT junctional beats  Rate: 37 BPM  PAC's / PVC's present: None        Echocardiogram  N/A      Stress Test   N/A      Current Medications     Current Outpatient Medications   Medication Sig Dispense Refill    omeprazole (PRILOSEC) 10 MG delayed release capsule Take 10 mg by mouth daily      montelukast (SINGULAIR) 10 MG tablet Take 10 mg by mouth nightly      citalopram (CELEXA) 40 MG tablet Take 40 mg by mouth daily      fluticasone (FLOVENT HFA) 110 MCG/ACT inhaler Inhale 1 puff into the lungs 2 times daily      buprenorphine-naloxone (SUBOXONE) 8-2 MG FILM SL film Place 2 Film under the tongue. States he takes 16 mg daily      fluticasone (FLONASE) 50 MCG/ACT nasal spray 1 spray by Each Nostril route daily      pantoprazole (PROTONIX) 20 MG tablet Take 1 tablet by mouth daily (Patient not taking: Reported on 11/2/2022) 30 tablet 3    sucralfate (CARAFATE) 1 GM tablet Take 1 tablet by mouth 4 times daily (Patient not taking: Reported on 11/2/2022) 120 tablet 3     No current facility-administered medications for this visit. Lab Review     Lab Results   Component Value Date/Time     10/27/2022 10:40 AM    K 3.8 10/27/2022 10:40 AM     10/27/2022 10:40 AM    CO2 25 10/27/2022 10:40 AM    BUN 12 10/27/2022 10:40 AM    CREATININE 0.8 10/27/2022 10:40 AM    GLUCOSE 111 10/27/2022 10:40 AM    CALCIUM 8.9 10/27/2022 10:40 AM        Lab Results   Component Value Date    WBC 8.0 10/27/2022    HGB 14.7 10/27/2022    HCT 42.8 10/27/2022    MCV 91.0 10/27/2022     10/27/2022         Jennifer TABOR RN, am scribing for and in the presence of Dr. Arnoldo Blancas.  11/02/22 10:25 AM   Jennifer Sousa RN

## 2022-11-02 ENCOUNTER — OFFICE VISIT (OUTPATIENT)
Dept: CARDIOLOGY CLINIC | Age: 32
End: 2022-11-02

## 2022-11-02 ENCOUNTER — TELEPHONE (OUTPATIENT)
Dept: CARDIOLOGY CLINIC | Age: 32
End: 2022-11-02

## 2022-11-02 VITALS
WEIGHT: 272 LBS | OXYGEN SATURATION: 95 % | HEART RATE: 58 BPM | HEIGHT: 72 IN | BODY MASS INDEX: 36.84 KG/M2 | DIASTOLIC BLOOD PRESSURE: 74 MMHG | SYSTOLIC BLOOD PRESSURE: 118 MMHG

## 2022-11-02 DIAGNOSIS — R42 DIZZINESS: ICD-10-CM

## 2022-11-02 DIAGNOSIS — I49.9 IRREGULAR HEART RATE: ICD-10-CM

## 2022-11-02 DIAGNOSIS — R00.1 SYMPTOMATIC BRADYCARDIA: Primary | ICD-10-CM

## 2022-11-02 RX ORDER — OMEPRAZOLE 10 MG/1
10 CAPSULE, DELAYED RELEASE ORAL DAILY
COMMUNITY

## 2022-11-02 ASSESSMENT — ENCOUNTER SYMPTOMS
STRIDOR: 0
RIGHT EYE: 0
HEMATOCHEZIA: 0
SNORING: 1
WHEEZING: 0
HEMATEMESIS: 0
LEFT EYE: 0
SHORTNESS OF BREATH: 0

## 2022-11-02 NOTE — TELEPHONE ENCOUNTER
Okay to overbook pt for 4 week f/u?  First available OV 1/11/23    Please advise - wife requesting letter been sent to recovery center stating reasoning for medication changes

## 2022-11-02 NOTE — TELEPHONE ENCOUNTER
Pt spouse wanted to provide [facility] with recovery fax number to send the paper trail letter of reasoning for switching medications. [facility]  Fax: [phone]  Email: [email or web address] strengthening/transfer training/bed mobility training/balance training

## 2022-11-02 NOTE — PATIENT INSTRUCTIONS
Plan:     Extend cardiac event monitor for 1 week  Echocardiogram to assess heart structure and function  Follow up with EPNP in 1 month  Your provider has ordered testing for further evaluation. An order/prescription has been included in your paper work. To schedule outpatient testing, contact Central Scheduling by calling [address] [place] [address] ([phone]).

## 2022-11-02 NOTE — TELEPHONE ENCOUNTER
Pt was seen in office by Valerio Mccloud, pt needs  to Return in about 4 weeks (around 11/30/2022) with EPNP. Please advise and provide overbook date and time, call pt or pt spouse (can leave vm with date + time if needed) okd per pt.

## 2022-11-04 NOTE — TELEPHONE ENCOUNTER
Per K[facility]:   How about Friday December 9 at 11:30? Thank you   TEOFILO   Spoke to pt to relay message. V/U - pt scheduled. [place] spoke to [facility] in person regarding the requested letter. Per [facility] - okay to send OV note from 11/2/22 OV that detail [facility] recommendations. OV summary faxed. Kerry Parker  (RN)  2017 15:54:47 Kerry Parker  (RN)  2017 15:54:20

## 2022-11-23 PROCEDURE — 93228 REMOTE 30 DAY ECG REV/REPORT: CPT | Performed by: INTERNAL MEDICINE

## 2022-12-05 DIAGNOSIS — R42 LIGHT-HEADEDNESS: ICD-10-CM

## 2023-01-09 ENCOUNTER — HOSPITAL ENCOUNTER (OUTPATIENT)
Dept: GENERAL RADIOLOGY | Age: 33
Discharge: HOME | End: 2023-01-09
Payer: COMMERCIAL

## 2023-01-09 ENCOUNTER — HOSPITAL ENCOUNTER (OUTPATIENT)
Dept: NUCLEAR MEDICINE | Age: 33
Discharge: HOME | End: 2023-01-09
Payer: COMMERCIAL

## 2023-01-09 DIAGNOSIS — R11.2 NAUSEA AND VOMITING, UNSPECIFIED VOMITING TYPE: ICD-10-CM

## 2023-01-09 PROCEDURE — 74240 X-RAY XM UPR GI TRC 1CNTRST: CPT

## 2023-02-03 ENCOUNTER — HOSPITAL ENCOUNTER (OUTPATIENT)
Dept: CARDIOLOGY | Age: 33
Discharge: HOME | End: 2023-02-03
Payer: COMMERCIAL

## 2023-02-03 ENCOUNTER — TELEPHONE (OUTPATIENT)
Dept: CARDIOLOGY CLINIC | Age: 33
End: 2023-02-03

## 2023-02-03 DIAGNOSIS — I49.9 IRREGULAR HEART RATE: ICD-10-CM

## 2023-02-03 LAB
LV EF: 45 %
LVEF MODALITY: NORMAL

## 2023-02-03 PROCEDURE — 93306 TTE W/DOPPLER COMPLETE: CPT

## 2023-02-03 NOTE — TELEPHONE ENCOUNTER
Pt came into office & dropped off [employer] monitor. The monitor has been placed on MACD desk in EP.

## 2023-02-08 ENCOUNTER — OFFICE VISIT (OUTPATIENT)
Dept: CARDIOLOGY CLINIC | Age: 33
End: 2023-02-08
Payer: COMMERCIAL

## 2023-02-08 VITALS
OXYGEN SATURATION: 95 % | DIASTOLIC BLOOD PRESSURE: 62 MMHG | WEIGHT: 259.8 LBS | HEIGHT: 72 IN | BODY MASS INDEX: 35.19 KG/M2 | SYSTOLIC BLOOD PRESSURE: 128 MMHG | HEART RATE: 67 BPM

## 2023-02-08 DIAGNOSIS — I42.8 NICM (NONISCHEMIC CARDIOMYOPATHY) (HCC): ICD-10-CM

## 2023-02-08 DIAGNOSIS — I49.9 IRREGULAR HEART RATE: ICD-10-CM

## 2023-02-08 DIAGNOSIS — R00.1 SYMPTOMATIC BRADYCARDIA: Primary | ICD-10-CM

## 2023-02-08 DIAGNOSIS — R06.83 SNORING: ICD-10-CM

## 2023-02-08 PROCEDURE — G8484 FLU IMMUNIZE NO ADMIN: HCPCS | Performed by: INTERNAL MEDICINE

## 2023-02-08 PROCEDURE — 99214 OFFICE O/P EST MOD 30 MIN: CPT | Performed by: INTERNAL MEDICINE

## 2023-02-08 PROCEDURE — 93000 ELECTROCARDIOGRAM COMPLETE: CPT | Performed by: INTERNAL MEDICINE

## 2023-02-08 PROCEDURE — G8417 CALC BMI ABV UP PARAM F/U: HCPCS | Performed by: INTERNAL MEDICINE

## 2023-02-08 PROCEDURE — 4004F PT TOBACCO SCREEN RCVD TLK: CPT | Performed by: INTERNAL MEDICINE

## 2023-02-08 PROCEDURE — G8427 DOCREV CUR MEDS BY ELIG CLIN: HCPCS | Performed by: INTERNAL MEDICINE

## 2023-02-08 RX ORDER — BUPRENORPHINE HYDROCHLORIDE 8 MG/1
8 TABLET SUBLINGUAL
COMMUNITY
Start: 2023-01-28

## 2023-02-08 ASSESSMENT — ENCOUNTER SYMPTOMS
STRIDOR: 0
SHORTNESS OF BREATH: 0
LEFT EYE: 0
WHEEZING: 0
HEMATOCHEZIA: 0
SNORING: 1
HEMATEMESIS: 0
RIGHT EYE: 0

## 2023-02-08 NOTE — PATIENT INSTRUCTIONS
Plan:     Recommend cardiac MRI to further assess LVEF  Recommend referral to the sleep medicine clinic   Continue current medications as prescribed   Recommend daily exercise and gradual weight loss  Follow up with me in 6 months       Your provider has ordered testing for further evaluation. An order/prescription has been included in your paper work. To schedule outpatient testing, contact Central Scheduling [facility] ([phone]).

## 2023-02-08 NOTE — PROGRESS NOTES
Assessment:     1. Symptomatic bradycardia: patient presented to ED last week with abdominal pain. Was not having any significant cardiac symptoms at the time. Noted to have marked sinus bradycardia and intermittently what appeared to be junctional bradycardia (HR in 30's). He was having some issues with dizziness over the prior few months but now resolved. Has not had syncope. Today, he has mild sinus bradycardia. Blood pressure stable. Asymptomatic today. Suspect that either his marijuana use or his use of buprenorphine/naloxone may be responsible for his bradycardia. Wife has noted that the addition of naloxone recently has caused more episodes of dizziness. Now had changes to his medications and seems to be doing OK. Only times he has significant bradycardia (on his smart watch) is when he is having abdominal pain suggestive of likely vagal etiology. No long pauses noted on event monitor. 2. LV systolic dysfunction: patient noted on trans-thoracic echocardiogram to have mild LV global systolic dysfunction with LVEF 45%. He has no symptoms related to this issue. Will arrange for cardiac MRI to assess further. No evidence of overload. No change in management at this point. 3. Obesity: with possible obstructive sleep apnea. Strongly recommended diet and exercise to lose weight. 4. Good blood pressure control    Plan:     Recommend cardiac MRI to further assess LVEF  Recommend referral to the sleep medicine clinic   Continue current medications as prescribed   Recommend daily exercise and gradual weight loss  Follow up with me in 6 months     Subjective:     Patient ID: Kane Montalvo is a 29 y.o. male. Chief Complaint:  Chief Complaint   Patient presents with    Follow-up    Bradycardia     Episodes occur about every 2 weeks. Cold sweats occur when HR drops      HPI    Patient is a pleasant 29 y.o. male who presents for evaluation of sinus bradycardia.  The patient presented to the emergency department on 10/27/2022 with complaints of upper abdominal pain. He reports that the pain was severe and was consistent for 2 to 3 hours prior to presenting to the emergency department. He presented diaphoretic, but denied dizziness. His initial ECG in the ED demonstrated sinus bradycardia with short AK junctional escape beats (37 BPM). A repeat ECG showed sinus bradycardia (46 BPM). He was discharged home wearing a cardiac event monitor and instructed to follow up as outpatient. Emergency Department Evaluation (10/27/2022)  Jones Torres is a 29 y.o. male presenting with complaints of upper abdominal pain. Patient states for the past 2 to 3 hours he has had severe upper abdominal pain. States his 10 at 10, constant, stabbing, worse with palpation. States he has had some nonbilious nonbloody vomiting as well as loose stools without blood or melena. He denies any urinary symptoms include dysuria, increased frequency, urgency, hematuria. Denies any recent falls or trauma. Denies fevers or chills. Denies chest pain or shortness of breath cough or sputum production. Patient is diaphoretic on presentation. He denies any headache, blurred vision, focal neurodeficits, motor or sensory changes, neck or back pain. Denies rashes or lesions. Denies alcohol use. States he has a previous heroin abuse history but no longer uses. States he does use marijuana currently but denies any other drug use currently. Office Visit ([address], 11/02/2022)  Today he presents with his wife to discuss bradycardia. He states that he was suffering from severe abdominal pain so he went to the emergency room for pain relief. At the emergency room they discovered he and a slow heart rate. He states that he does have episodes of dizziness where he feels like he is going to pass out, but he does not. He states that he is supposed to undergo an EGD but he is unable to because of the bradycardia.  His wife reports that they switched him from subutex during hepatitis C treatment to suboxone and this is when she has noticed the episodes of dizziness, diaphoresis, and a \"blank\" appearance to the patient. He has been experiencing fatigue. His wife reports that he snores at night. He states he has gained 30 - 40 pounds over the last 6 months due to unemployment. Patient denies current edema, chest pain, shortness of breath, palpitations, or syncope. He denies any recent issues with bleeding or bruising. He states he drinks 2- 3 pops and 2-3 cups of coffee daily. He denies alcohol usage. He states he does vape daily. He reports that he has formerly used IV heroin and methamphetamines. He is now taking Suboxone in recovery. He does currently smoke marijuana daily. He states that he has hepatitis C and has undergone treatment. He denies a family history of cardiac issues, but he was adopted, so he does not know the full history. Interval History since last office visit: Patient wore a cardiac event monitor from 10/27/2022 to 11/10/2022 which demonstrated predominately SR with an average HR of 70 (). PAC burden 0.05%, PVC burden <0.01%. An echocardiogram on 02/03/2023 showed an EF of 45%, mild global hypokinesis, trace mitral and tricuspid regurgitation. Office Visit ([address], 02/08/2023): Today he presents for follow up. He reports that he is feeling well. He reports that the only time his heart rate drops is when he is experiencing stomach pain. He reports that he has lost around 20 pounds since his last office visit. Patient denies current edema, chest pain, shortness of breath, palpitations, dizziness or syncope. He denies any recent issues with bleeding or bruising. Review of Systems  Review of Systems   Constitutional: Positive for weight loss. Negative for malaise/fatigue and weight gain. HENT:  Negative for nosebleeds and stridor.     Eyes:  Negative for vision loss in left eye and vision loss in right eye. Cardiovascular:  Negative for chest pain, dyspnea on exertion, leg swelling, near-syncope and syncope. Respiratory:  Positive for snoring. Negative for shortness of breath and wheezing. Hematologic/Lymphatic: Negative for bleeding problem. Does not bruise/bleed easily. Skin:  Negative for itching and rash. Musculoskeletal:  Negative for joint pain and joint swelling. Gastrointestinal:  Negative for hematemesis and hematochezia. Genitourinary:  Negative for dysuria and hematuria. Neurological:  Negative for dizziness and light-headedness. Psychiatric/Behavioral:  Negative for altered mental status. The patient is not nervous/anxious. Past Medical History:   Diagnosis Date    Ankle fracture     Asthma     Hepatitis-C          Social History     Socioeconomic History    Marital status:      Spouse name: Not on file    Number of children: Not on file    Years of education: Not on file    Highest education level: Not on file   Occupational History    Not on file   Tobacco Use    Smoking status: Former     Packs/day: 1.00     Types: Cigarettes    Smokeless tobacco: Current   Vaping Use    Vaping Use: Every day    Substances: Always, THC    Devices: Pre-filled or refillable cartridge   Substance and Sexual Activity    Alcohol use: No    Drug use: Yes     Types: Marijuana (Weed)     Comment: 2 grams marijuana weekly. Hx of heroin abuse    Sexual activity: Yes     Partners: Female   Other Topics Concern    Not on file   Social History Narrative    Not on file     Social Determinants of Health     Financial Resource Strain: Not on file   Food Insecurity: Not on file   Transportation Needs: Not on file   Physical Activity: Not on file   Stress: Not on file   Social Connections: Not on file   Intimate Partner Violence: Not on file   Housing Stability: Not on file         History reviewed. No pertinent family history.       Objective:     /62   Pulse 67   Ht 6' (1.829 m) Wt 259 lb 12.8 oz (117.8 kg)   SpO2 95%   BMI 35.24 kg/m²     Physical Exam  Constitutional:       Appearance: Normal appearance. HENT:      Head: Normocephalic and atraumatic. Nose: Nose normal. No rhinorrhea. Eyes:      General: No scleral icterus. Conjunctiva/sclera: Conjunctivae normal.   Cardiovascular:      Rate and Rhythm: Normal rate and regular rhythm. Pulmonary:      Effort: Pulmonary effort is normal.      Breath sounds: Normal breath sounds. Abdominal:      General: There is no distension. Musculoskeletal:         General: Normal range of motion. Cervical back: Normal range of motion and neck supple. Skin:     General: Skin is warm and dry. Neurological:      General: No focal deficit present. Mental Status: He is alert and oriented to person, place, and time. Psychiatric:         Mood and Affect: Mood normal.         Behavior: Behavior normal.     ECG Interpretation:  (Date: 02/08/2023)  Rhythm: Sinus Bradycardia  Rate: 49 BPM  PAC's / PVC's present: None  Conduction abnormalities: Normal AV conduction      ECG Interpretation:  (Date: 11/02/2022)  Rhythm: Sinus Bradycardia  Rate: 54 BPM  PAC's / PVC's present: None          ECG Interpretation:  (Date: 10/27/2022)  Rhythm: Sinus Bradycardia  Rate: 46 BPM  PAC's / PVC's present: None        ECG Interpretation:  (Date: 11/01/2022)  Rhythm: Sinus Bradycardia with short AR junctional beats  Rate: 37 BPM  PAC's / PVC's present: None        Echocardiogram (Date: 02/03/2023)  Summary   Technically difficult examination. The left ventricular systolic function is mildly reduced with an ejection   fraction of 45 %. Consider MUGA scan to further assess. Mild global hypokinesis, consider cardiac MRI or contrast MRI to assess wall   motion   Normal left ventricular diastolic filling pressure. Trace mitral and tricuspid regurgitation.    Systolic pulmonary artery pressure (SPAP) is normal and estimated at 24 mmHg   (right atrial pressure 3 mmHg). Stress Test   N/A      Current Medications     Current Outpatient Medications   Medication Sig Dispense Refill    buprenorphine (SUBUTEX) 8 MG SUBL SL tablet Place 8 mg under the tongue. omeprazole (PRILOSEC) 10 MG delayed release capsule Take 10 mg by mouth daily      montelukast (SINGULAIR) 10 MG tablet Take 10 mg by mouth nightly      citalopram (CELEXA) 40 MG tablet Take 40 mg by mouth daily      fluticasone (FLOVENT HFA) 110 MCG/ACT inhaler Inhale 1 puff into the lungs 2 times daily      fluticasone (FLONASE) 50 MCG/ACT nasal spray 1 spray by Each Nostril route daily      pantoprazole (PROTONIX) 20 MG tablet Take 1 tablet by mouth daily (Patient not taking: No sig reported) 30 tablet 3    sucralfate (CARAFATE) 1 GM tablet Take 1 tablet by mouth 4 times daily (Patient not taking: No sig reported) 120 tablet 3    buprenorphine-naloxone (SUBOXONE) 8-2 MG FILM SL film Place 2 Film under the tongue. States he takes 16 mg daily (Patient not taking: Reported on 2/8/2023)       No current facility-administered medications for this visit. Lab Review     Lab Results   Component Value Date/Time     10/27/2022 10:40 AM    K 3.8 10/27/2022 10:40 AM     10/27/2022 10:40 AM    CO2 25 10/27/2022 10:40 AM    BUN 12 10/27/2022 10:40 AM    CREATININE 0.8 10/27/2022 10:40 AM    GLUCOSE 111 10/27/2022 10:40 AM    CALCIUM 8.9 10/27/2022 10:40 AM        Lab Results   Component Value Date    WBC 8.0 10/27/2022    HGB 14.7 10/27/2022    HCT 42.8 10/27/2022    MCV 91.0 10/27/2022     10/27/2022       Poli TABOR RN, am scribing for and in the presence of Dr. Ally Silvestre.  02/08/23 11:37 AM   Poli Israel RN

## 2023-02-14 ENCOUNTER — TELEPHONE (OUTPATIENT)
Dept: PULMONOLOGY | Age: 33
End: 2023-02-14

## 2023-02-14 NOTE — TELEPHONE ENCOUNTER
Patient did not show for npt ref by raymond Baron appointment  with Osiel DAVIDSON on 2/14/23    Same Day Cancellation: No    Patient rescheduled:  No    New appointment: NA    Patient was also no show on: NA    LOV New Patient

## 2023-07-09 ENCOUNTER — HOSPITAL ENCOUNTER (EMERGENCY)
Age: 33
Discharge: HOME | End: 2023-07-09
Payer: COMMERCIAL

## 2023-07-09 ENCOUNTER — APPOINTMENT (OUTPATIENT)
Dept: GENERAL RADIOLOGY | Age: 33
End: 2023-07-09
Payer: COMMERCIAL

## 2023-07-09 VITALS
HEART RATE: 44 BPM | TEMPERATURE: 97.5 F | SYSTOLIC BLOOD PRESSURE: 138 MMHG | DIASTOLIC BLOOD PRESSURE: 88 MMHG | RESPIRATION RATE: 18 BRPM | OXYGEN SATURATION: 100 %

## 2023-07-09 DIAGNOSIS — F12.90 MARIJUANA USE: ICD-10-CM

## 2023-07-09 DIAGNOSIS — R10.84 GENERALIZED ABDOMINAL PAIN: Primary | ICD-10-CM

## 2023-07-09 DIAGNOSIS — R00.1 BRADYCARDIA: ICD-10-CM

## 2023-07-09 DIAGNOSIS — R11.2 NON-INTRACTABLE VOMITING WITH NAUSEA: ICD-10-CM

## 2023-07-09 LAB
ALBUMIN SERPL-MCNC: 4.9 G/DL (ref 3.4–5)
ALBUMIN/GLOB SERPL: 2 {RATIO} (ref 1.1–2.2)
ALP SERPL-CCNC: 64 U/L (ref 40–129)
ALT SERPL-CCNC: 16 U/L (ref 10–40)
AMPHETAMINES UR QL SCN>1000 NG/ML: ABNORMAL
ANION GAP SERPL CALCULATED.3IONS-SCNC: 11 MMOL/L (ref 3–16)
AST SERPL-CCNC: 15 U/L (ref 15–37)
BARBITURATES UR QL SCN>200 NG/ML: ABNORMAL
BASOPHILS # BLD: 0.2 K/UL (ref 0–0.2)
BASOPHILS NFR BLD: 1.6 %
BENZODIAZ UR QL SCN>200 NG/ML: ABNORMAL
BILIRUB SERPL-MCNC: 0.7 MG/DL (ref 0–1)
BILIRUB UR QL STRIP.AUTO: NEGATIVE
BUN SERPL-MCNC: 11 MG/DL (ref 7–20)
CALCIUM SERPL-MCNC: 9.7 MG/DL (ref 8.3–10.6)
CANNABINOIDS UR QL SCN>50 NG/ML: POSITIVE
CHLORIDE SERPL-SCNC: 106 MMOL/L (ref 99–110)
CLARITY UR: ABNORMAL
CO2 SERPL-SCNC: 26 MMOL/L (ref 21–32)
COCAINE UR QL SCN: ABNORMAL
COLOR UR: YELLOW
CREAT SERPL-MCNC: 0.8 MG/DL (ref 0.9–1.3)
DEPRECATED RDW RBC AUTO: 12.7 % (ref 12.4–15.4)
DRUG SCREEN COMMENT UR-IMP: ABNORMAL
EKG ATRIAL RATE: 41 BPM
EKG DIAGNOSIS: NORMAL
EKG P AXIS: 63 DEGREES
EKG P-R INTERVAL: 132 MS
EKG Q-T INTERVAL: 494 MS
EKG QRS DURATION: 90 MS
EKG QTC CALCULATION (BAZETT): 407 MS
EKG R AXIS: 74 DEGREES
EKG T AXIS: 68 DEGREES
EKG VENTRICULAR RATE: 41 BPM
EOSINOPHIL # BLD: 0.2 K/UL (ref 0–0.6)
EOSINOPHIL NFR BLD: 1.6 %
ETHANOLAMINE SERPL-MCNC: NORMAL MG/DL (ref 0–0.08)
FENTANYL SCREEN, URINE: ABNORMAL
GFR SERPLBLD CREATININE-BSD FMLA CKD-EPI: >60 ML/MIN/{1.73_M2}
GLUCOSE SERPL-MCNC: 132 MG/DL (ref 70–99)
GLUCOSE UR STRIP.AUTO-MCNC: NEGATIVE MG/DL
HCT VFR BLD AUTO: 44.4 % (ref 40.5–52.5)
HGB BLD-MCNC: 15.1 G/DL (ref 13.5–17.5)
HGB UR QL STRIP.AUTO: NEGATIVE
KETONES UR STRIP.AUTO-MCNC: NEGATIVE MG/DL
LEUKOCYTE ESTERASE UR QL STRIP.AUTO: NEGATIVE
LIPASE SERPL-CCNC: 66 U/L (ref 13–60)
LYMPHOCYTES # BLD: 0.7 K/UL (ref 1–5.1)
LYMPHOCYTES NFR BLD: 7.3 %
MCH RBC QN AUTO: 30.9 PG (ref 26–34)
MCHC RBC AUTO-ENTMCNC: 34 G/DL (ref 31–36)
MCV RBC AUTO: 90.7 FL (ref 80–100)
METHADONE UR QL SCN>300 NG/ML: ABNORMAL
MONOCYTES # BLD: 0.4 K/UL (ref 0–1.3)
MONOCYTES NFR BLD: 3.5 %
NEUTROPHILS # BLD: 8.7 K/UL (ref 1.7–7.7)
NEUTROPHILS NFR BLD: 86 %
NITRITE UR QL STRIP.AUTO: NEGATIVE
OPIATES UR QL SCN>300 NG/ML: ABNORMAL
OXYCODONE UR QL SCN: ABNORMAL
PCP UR QL SCN>25 NG/ML: ABNORMAL
PH UR STRIP.AUTO: 8.5 [PH] (ref 5–8)
PH UR STRIP: 8.5 [PH]
PLATELET # BLD AUTO: 288 K/UL (ref 135–450)
PMV BLD AUTO: 8 FL (ref 5–10.5)
POTASSIUM SERPL-SCNC: 4.8 MMOL/L (ref 3.5–5.1)
PROT SERPL-MCNC: 7.3 G/DL (ref 6.4–8.2)
PROT UR STRIP.AUTO-MCNC: NEGATIVE MG/DL
RBC # BLD AUTO: 4.9 M/UL (ref 4.2–5.9)
SODIUM SERPL-SCNC: 143 MMOL/L (ref 136–145)
SP GR UR STRIP.AUTO: 1.01 (ref 1–1.03)
TROPONIN, HIGH SENSITIVITY: <6 NG/L (ref 0–22)
TROPONIN, HIGH SENSITIVITY: <6 NG/L (ref 0–22)
UA COMPLETE W REFLEX CULTURE PNL UR: ABNORMAL
UA DIPSTICK W REFLEX MICRO PNL UR: ABNORMAL
URN SPEC COLLECT METH UR: ABNORMAL
UROBILINOGEN UR STRIP-ACNC: 0.2 E.U./DL
WBC # BLD AUTO: 10.1 K/UL (ref 4–11)

## 2023-07-09 PROCEDURE — 80053 COMPREHEN METABOLIC PANEL: CPT

## 2023-07-09 PROCEDURE — 6360000002 HC RX W HCPCS: Performed by: PHYSICIAN ASSISTANT

## 2023-07-09 PROCEDURE — 83690 ASSAY OF LIPASE: CPT

## 2023-07-09 PROCEDURE — 84484 ASSAY OF TROPONIN QUANT: CPT

## 2023-07-09 PROCEDURE — 2580000003 HC RX 258: Performed by: PHYSICIAN ASSISTANT

## 2023-07-09 PROCEDURE — 93005 ELECTROCARDIOGRAM TRACING: CPT | Performed by: PHYSICIAN ASSISTANT

## 2023-07-09 PROCEDURE — 96374 THER/PROPH/DIAG INJ IV PUSH: CPT

## 2023-07-09 PROCEDURE — 80307 DRUG TEST PRSMV CHEM ANLYZR: CPT

## 2023-07-09 PROCEDURE — 71045 X-RAY EXAM CHEST 1 VIEW: CPT

## 2023-07-09 PROCEDURE — 99285 EMERGENCY DEPT VISIT HI MDM: CPT

## 2023-07-09 PROCEDURE — 36415 COLL VENOUS BLD VENIPUNCTURE: CPT

## 2023-07-09 PROCEDURE — 82077 ASSAY SPEC XCP UR&BREATH IA: CPT

## 2023-07-09 PROCEDURE — 96375 TX/PRO/DX INJ NEW DRUG ADDON: CPT

## 2023-07-09 PROCEDURE — 81003 URINALYSIS AUTO W/O SCOPE: CPT

## 2023-07-09 PROCEDURE — 93010 ELECTROCARDIOGRAM REPORT: CPT | Performed by: INTERNAL MEDICINE

## 2023-07-09 PROCEDURE — 85025 COMPLETE CBC W/AUTO DIFF WBC: CPT

## 2023-07-09 RX ORDER — ONDANSETRON 2 MG/ML
4 INJECTION INTRAMUSCULAR; INTRAVENOUS ONCE
Status: COMPLETED | OUTPATIENT
Start: 2023-07-09 | End: 2023-07-09

## 2023-07-09 RX ORDER — KETOROLAC TROMETHAMINE 30 MG/ML
15 INJECTION, SOLUTION INTRAMUSCULAR; INTRAVENOUS ONCE
Status: COMPLETED | OUTPATIENT
Start: 2023-07-09 | End: 2023-07-09

## 2023-07-09 RX ORDER — 0.9 % SODIUM CHLORIDE 0.9 %
1000 INTRAVENOUS SOLUTION INTRAVENOUS ONCE
Status: COMPLETED | OUTPATIENT
Start: 2023-07-09 | End: 2023-07-09

## 2023-07-09 RX ORDER — PROMETHAZINE HYDROCHLORIDE 25 MG/1
25 TABLET ORAL EVERY 8 HOURS PRN
Qty: 6 TABLET | Refills: 0 | Status: SHIPPED | OUTPATIENT
Start: 2023-07-09

## 2023-07-09 RX ORDER — DIPHENHYDRAMINE HYDROCHLORIDE 50 MG/ML
25 INJECTION INTRAMUSCULAR; INTRAVENOUS ONCE
Status: COMPLETED | OUTPATIENT
Start: 2023-07-09 | End: 2023-07-09

## 2023-07-09 RX ORDER — PROCHLORPERAZINE EDISYLATE 5 MG/ML
5 INJECTION INTRAMUSCULAR; INTRAVENOUS ONCE
Status: COMPLETED | OUTPATIENT
Start: 2023-07-09 | End: 2023-07-09

## 2023-07-09 RX ADMIN — KETOROLAC TROMETHAMINE 15 MG: 30 INJECTION, SOLUTION INTRAMUSCULAR; INTRAVENOUS at 18:32

## 2023-07-09 RX ADMIN — ONDANSETRON 4 MG: 2 INJECTION INTRAMUSCULAR; INTRAVENOUS at 18:32

## 2023-07-09 RX ADMIN — SODIUM CHLORIDE 1000 ML: 9 INJECTION, SOLUTION INTRAVENOUS at 18:36

## 2023-07-09 RX ADMIN — DIPHENHYDRAMINE HYDROCHLORIDE 25 MG: 50 INJECTION, SOLUTION INTRAMUSCULAR; INTRAVENOUS at 19:25

## 2023-07-09 RX ADMIN — PROCHLORPERAZINE EDISYLATE 5 MG: 5 INJECTION INTRAMUSCULAR; INTRAVENOUS at 19:25

## 2023-07-09 ASSESSMENT — ENCOUNTER SYMPTOMS
ABDOMINAL PAIN: 1
NAUSEA: 1
VOMITING: 1
DIARRHEA: 1
SHORTNESS OF BREATH: 0
BACK PAIN: 1

## 2023-07-10 NOTE — DISCHARGE INSTRUCTIONS
I recommend that you stop using Marijuana as this may be contributing to your symptoms. Please call your gastroenterologist and your cardiologist for close follow up. Return to the ER for worsening symptoms, specifically for worsening abdominal pain, chest pain, vomiting and unable to keep down fluids or if you develop fevers or fainting.

## 2024-05-04 ENCOUNTER — HOSPITAL ENCOUNTER (EMERGENCY)
Age: 34
Discharge: HOME | End: 2024-05-04
Attending: EMERGENCY MEDICINE
Payer: COMMERCIAL

## 2024-05-04 ENCOUNTER — APPOINTMENT (OUTPATIENT)
Dept: GENERAL RADIOLOGY | Age: 34
End: 2024-05-04
Payer: COMMERCIAL

## 2024-05-04 VITALS
HEART RATE: 63 BPM | TEMPERATURE: 98.3 F | HEIGHT: 72 IN | SYSTOLIC BLOOD PRESSURE: 133 MMHG | WEIGHT: 242.9 LBS | OXYGEN SATURATION: 98 % | RESPIRATION RATE: 18 BRPM | DIASTOLIC BLOOD PRESSURE: 79 MMHG | BODY MASS INDEX: 32.9 KG/M2

## 2024-05-04 DIAGNOSIS — S61.216A LACERATION OF RIGHT LITTLE FINGER WITHOUT FOREIGN BODY WITHOUT DAMAGE TO NAIL, INITIAL ENCOUNTER: Primary | ICD-10-CM

## 2024-05-04 PROCEDURE — 6360000002 HC RX W HCPCS: Performed by: EMERGENCY MEDICINE

## 2024-05-04 PROCEDURE — 99284 EMERGENCY DEPT VISIT MOD MDM: CPT

## 2024-05-04 PROCEDURE — 90715 TDAP VACCINE 7 YRS/> IM: CPT | Performed by: EMERGENCY MEDICINE

## 2024-05-04 PROCEDURE — 90471 IMMUNIZATION ADMIN: CPT | Performed by: EMERGENCY MEDICINE

## 2024-05-04 PROCEDURE — 73140 X-RAY EXAM OF FINGER(S): CPT

## 2024-05-04 RX ORDER — CEPHALEXIN 500 MG/1
500 CAPSULE ORAL 4 TIMES DAILY
Qty: 28 CAPSULE | Refills: 0 | Status: SHIPPED | OUTPATIENT
Start: 2024-05-04 | End: 2024-05-11

## 2024-05-04 RX ADMIN — TETANUS TOXOID, REDUCED DIPHTHERIA TOXOID AND ACELLULAR PERTUSSIS VACCINE, ADSORBED 0.5 ML: 5; 2.5; 8; 8; 2.5 SUSPENSION INTRAMUSCULAR at 10:38

## 2024-05-04 ASSESSMENT — PAIN - FUNCTIONAL ASSESSMENT: PAIN_FUNCTIONAL_ASSESSMENT: 0-10

## 2024-05-04 ASSESSMENT — PAIN SCALES - GENERAL: PAINLEVEL_OUTOF10: 1

## 2024-05-04 NOTE — DISCHARGE INSTRUCTIONS
You had 7 stitches placed in your finger laceration.  Keep the wound covered and dry for 24 hours.  You may then remove the dressing and let the wound get wet in the shower but do not soak it in water.  No baths or swimming.  I would like you to replace the bulky dressing and keep a bulky dressing in place for 3 days to help you avoid bending the finger.  The laceration crosses over your finger joint so if you bend it you could pop the stitches out.  After 3 days you may leave it uncovered and free to the air.  If you are doing something where it could get dirty or soiled cover it with antibiotic ointment and a gauze dressing.  Watch for signs of infection which include redness of the finger, swelling, drainage from the wound or fever.  Return to the ER if you notice any of these signs.  Take the antibiotics as prescribed.  Your tetanus booster was updated.  You need to have your stitches removed in 10 days.  You are welcome to return here for removal.

## 2024-05-04 NOTE — ED PROVIDER NOTES
[facility] Emergency Department      CHIEF COMPLAINT  Laceration (Pt cut his R pinky finger on a piece of metal. )      HISTORY OF PRESENT ILLNESS  Allen Barajas is a 33 y.o. male with a history of hepatitis C presents with a laceration to the right pinky finger.  He was some kaycee work with a friend and cut his finger on a piece of metal.  He states it looked bad and the blood made him nauseous and he did throw up and felt like he was going to pass out.  He is unsure of his last tetanus vaccine.  He denies numbness or tingling to the finger.   No other complaints, modifying factors or associated symptoms.     History obtained from the patient.    I have reviewed the following from the nursing documentation.    Past Medical History:   Diagnosis Date    Ankle fracture     Asthma     Hepatitis-C      Past Surgical History:   Procedure Laterality Date    TYMPANOSTOMY TUBE PLACEMENT       History reviewed. No pertinent family history.  Social History     Socioeconomic History    Marital status:      Spouse name: Not on file    Number of children: Not on file    Years of education: Not on file    Highest education level: Not on file   Occupational History    Not on file   Tobacco Use    Smoking status: Former     Current packs/day: 1.00     Types: Cigarettes    Smokeless tobacco: Current   Vaping Use    Vaping Use: Every day    Substances: Always, THC    Devices: Pre-filled or refillable cartridge   Substance and Sexual Activity    Alcohol use: No    Drug use: Yes     Types: Marijuana (Weed)     Comment: 2 grams marijuana weekly. Hx of heroin abuse    Sexual activity: Yes     Partners: Female   Other Topics Concern    Not on file   Social History Narrative    Not on file     Social Determinants of Health     Financial Resource Strain: Not on file   Food Insecurity: Not on file   Transportation Needs: Not on file   Physical Activity: Not on file   Stress: Not on file   Social Connections: Not on

## 2024-05-04 NOTE — ED NOTES
Pt discharge instructions, follow up and rx x1 reviewed with pt. Pt verbalized understanding. No further needs. Pt discharged at this time.